# Patient Record
Sex: FEMALE | Race: WHITE | Employment: PART TIME | URBAN - METROPOLITAN AREA
[De-identification: names, ages, dates, MRNs, and addresses within clinical notes are randomized per-mention and may not be internally consistent; named-entity substitution may affect disease eponyms.]

---

## 2017-03-29 ENCOUNTER — OFFICE VISIT (OUTPATIENT)
Dept: PHYSICAL THERAPY | Facility: HOSPITAL | Age: 72
End: 2017-03-29
Attending: INTERNAL MEDICINE
Payer: MEDICARE

## 2017-03-29 DIAGNOSIS — K59.00 CONSTIPATION: Primary | ICD-10-CM

## 2017-03-29 PROCEDURE — 97535 SELF CARE MNGMENT TRAINING: CPT

## 2017-03-29 PROCEDURE — 97140 MANUAL THERAPY 1/> REGIONS: CPT

## 2017-03-29 PROCEDURE — 97161 PT EVAL LOW COMPLEX 20 MIN: CPT

## 2017-03-29 NOTE — PROGRESS NOTES
MUSCULOSKELETAL AND PELVIC FLOOR EVALUATION:   Referring Physician: Dr. Hang Gordon  Diagnosis: Constipation (K59.00)  Date of Onset: 6 months ago     Date of Service: 3/29/2017     PATIENT Erin Simon is a 73year old y/o female who presents to ligamentous/provocation testing normal with no concerns. Verbal consent was given for internal pelvic exam/assessment.  External perineal observation reveals normal tissue health and non-tender to palpation throughout pelvic clock bilateral. Internal pelvic hr  Amount: large  Nocturia: x2/night  Leaking occurs: N/A  Episodes of Leakage: N/A  Amount of leakage: N/A  Pad use: N/A  Pad Change frequency: N/A  Fluid Intake: 32 oz  Bladder irritants: coffee/tea/alcohol  Urine Stop test: N/A  Post void dribble: N/A WNL  Labia minora:  WNL  Urethral meatus: WNL  Introitus: WNL  Perineal body: WNL  Pelvic clock: non-tender to palpation throughout    Internal Palpation Left Right   Superficial Transverse perineal WNL WNL   Bulbocavernosus WNL WNL   Ischiocavernosus WNL W recreational exercise. 5. Pt to improve FOTO outcomes score to less than or equal to 25% impairment, for functional improvement in ADLs. Currently 47% impaired.      Patient Goal: \"have more control of bowels\"    Rehab Potential: good  Limiting factors:

## 2017-04-04 ENCOUNTER — OFFICE VISIT (OUTPATIENT)
Dept: PHYSICAL THERAPY | Facility: HOSPITAL | Age: 72
End: 2017-04-04
Attending: INTERNAL MEDICINE
Payer: MEDICARE

## 2017-04-04 PROCEDURE — 97110 THERAPEUTIC EXERCISES: CPT

## 2017-04-04 PROCEDURE — 97112 NEUROMUSCULAR REEDUCATION: CPT

## 2017-04-04 NOTE — PROGRESS NOTES
Dx: Constipation (K59.00)          Authorized # of Visits/Insurance:  Medicare (10)  Script Dates: 3/29-6/27           Next MD visit: none scheduled  Referring MD: Radha Orozco  Fall Risk:  standard         Precautions: n/a           Medication Changes Patient to report reduction in fecal incontinence episodes to 1-2x month for improve perineal tissue health/function and promote involvement in community based activities.    4. Patient to increase global proximal hip strength 1/2 MMT to improve lumbopelvi

## 2017-04-11 ENCOUNTER — OFFICE VISIT (OUTPATIENT)
Dept: PHYSICAL THERAPY | Facility: HOSPITAL | Age: 72
End: 2017-04-11
Attending: INTERNAL MEDICINE
Payer: MEDICARE

## 2017-04-11 PROCEDURE — 97112 NEUROMUSCULAR REEDUCATION: CPT

## 2017-04-11 PROCEDURE — 97110 THERAPEUTIC EXERCISES: CPT

## 2017-04-11 NOTE — PROGRESS NOTES
Dx: Constipation (K59.00)          Authorized # of Visits/Insurance:  Medicare (10)  Script Dates: 3/29-6/27           Next MD visit: none scheduled  Referring MD: Ruthie Ross  Fall Risk:  standard         Precautions: n/a           Medication Changes demonstrates improved pelvic floor strength, 4/5 MMT, for at least 8 seconds endurance in order to make it to the bathroom without experiencing fecal incontinence when urge present.    3.  Patient to report reduction in fecal incontinence episodes to 1-2x m

## 2017-04-18 ENCOUNTER — OFFICE VISIT (OUTPATIENT)
Dept: PHYSICAL THERAPY | Facility: HOSPITAL | Age: 72
End: 2017-04-18
Attending: INTERNAL MEDICINE
Payer: MEDICARE

## 2017-04-18 PROCEDURE — 97112 NEUROMUSCULAR REEDUCATION: CPT

## 2017-04-18 PROCEDURE — 97110 THERAPEUTIC EXERCISES: CPT

## 2017-04-18 NOTE — PROGRESS NOTES
Dx: Constipation (K59.00)          Authorized # of Visits/Insurance:  Medicare (10)  Script Dates: 3/29-6/27           Next MD visit: none scheduled  Referring MD: Sabine Bone  Fall Risk:  standard         Precautions: n/a           Medication Changes training. Charges: 1 TherEx, 2 NMR       Total Timed Treatment: 40 min  Total Treatment Time: 43 min  Therapist: Darcie Kirk PT, DPT    Long Term Goals Timeframe (8 visits, 3/29-6/27)  1.  Patient to be independent with progressive HEP during and upon

## 2017-04-25 ENCOUNTER — OFFICE VISIT (OUTPATIENT)
Dept: PHYSICAL THERAPY | Facility: HOSPITAL | Age: 72
End: 2017-04-25
Attending: INTERNAL MEDICINE
Payer: MEDICARE

## 2017-04-25 PROCEDURE — 97112 NEUROMUSCULAR REEDUCATION: CPT

## 2017-04-25 NOTE — PROGRESS NOTES
Dx: Constipation (K59.00)          Authorized # of Visits/Insurance:  Medicare (10)  Script Dates: 3/29-6/27           Next MD visit: none scheduled  Referring MD: Jarred Taylor  Fall Risk:  standard         Precautions: n/a           Medication Changes throughout. Provided verbal/tactile cues to maximize muscle activation and correct form. Updated HEP. Plan: Continue to progress pelvic floor/core/hip strengthening per patient tolerance with emphasis on functional training.      Charges: 2 NMR       To

## 2017-05-02 ENCOUNTER — OFFICE VISIT (OUTPATIENT)
Dept: PHYSICAL THERAPY | Facility: HOSPITAL | Age: 72
End: 2017-05-02
Attending: INTERNAL MEDICINE
Payer: MEDICARE

## 2017-05-02 PROCEDURE — 97112 NEUROMUSCULAR REEDUCATION: CPT

## 2017-05-02 NOTE — PROGRESS NOTES
Dx: Constipation (K59.00)          Authorized # of Visits/Insurance:  Medicare (10)  Script Dates: 3/29-6/27           Next MD visit: none scheduled  Referring MD: Ofelia López  Fall Risk:  standard         Precautions: uterine cancer 1989/1990; chemo/ upon discharge to aide with symptom management and return to previous level of function.    2. Patient to demonstrates improved pelvic floor strength, 4/5 MMT, for at least 8 seconds endurance in order to make it to the bathroom without experiencing fecal

## 2017-05-09 ENCOUNTER — OFFICE VISIT (OUTPATIENT)
Dept: PHYSICAL THERAPY | Facility: HOSPITAL | Age: 72
End: 2017-05-09
Attending: INTERNAL MEDICINE
Payer: MEDICARE

## 2017-05-09 PROCEDURE — 97535 SELF CARE MNGMENT TRAINING: CPT

## 2017-05-09 PROCEDURE — 97140 MANUAL THERAPY 1/> REGIONS: CPT

## 2017-05-09 NOTE — PROGRESS NOTES
Patient Name: Soni Campos  YOB: 1945          MRN number:  [id number]  Date:  5/9/2017  Referring Physician:  Jose Manuel Khan  Dx: Constipation (K59.00)    Physical Therapy Discharge Summary    Reporting Period: 3/29-5/9    Subjective: Judyth Shock 5/5   Extension 5/5 5/5   Ankle:     Dorsiflexion 5/5 5/5   Abdominals: NT      FLEXIBILITY/PALPATION  Muscle Left Right   Iliopsoas WNL WNL   Hamstrings WNL WNL   Piriformis WNL WNL   Adductors WNL WNL   Scars Location/Surgery: N/A      SPECIAL TESTS  All promote return to recreational exercise. (Met 5/9/17)    5. Pt to improve FOTO outcomes score to less than or equal to 25% impairment, for functional improvement in ADLs. Currently 47% impaired.      Patient Goal: Tia Zoëy more control of bowels\" (Met 5/9/1

## 2017-05-15 ENCOUNTER — APPOINTMENT (OUTPATIENT)
Dept: LAB | Facility: HOSPITAL | Age: 72
End: 2017-05-15
Attending: FAMILY MEDICINE
Payer: MEDICARE

## 2017-05-15 ENCOUNTER — OFFICE VISIT (OUTPATIENT)
Dept: FAMILY MEDICINE CLINIC | Facility: CLINIC | Age: 72
End: 2017-05-15

## 2017-05-15 VITALS
DIASTOLIC BLOOD PRESSURE: 86 MMHG | HEART RATE: 110 BPM | SYSTOLIC BLOOD PRESSURE: 124 MMHG | WEIGHT: 203 LBS | HEIGHT: 65 IN | OXYGEN SATURATION: 98 % | BODY MASS INDEX: 33.82 KG/M2 | TEMPERATURE: 98 F

## 2017-05-15 DIAGNOSIS — C55 UTERINE CARCINOMA (HCC): ICD-10-CM

## 2017-05-15 DIAGNOSIS — B37.9 CANDIDA ALBICANS INFECTION: ICD-10-CM

## 2017-05-15 DIAGNOSIS — E55.9 VITAMIN D DEFICIENCY: ICD-10-CM

## 2017-05-15 DIAGNOSIS — R15.2 INCONTINENCE OF FECES WITH FECAL URGENCY: ICD-10-CM

## 2017-05-15 DIAGNOSIS — R15.9 INCONTINENCE OF FECES WITH FECAL URGENCY: ICD-10-CM

## 2017-05-15 DIAGNOSIS — E66.09 OBESITY DUE TO EXCESS CALORIES, UNSPECIFIED OBESITY SEVERITY: ICD-10-CM

## 2017-05-15 DIAGNOSIS — E78.00 PURE HYPERCHOLESTEROLEMIA: Primary | ICD-10-CM

## 2017-05-15 DIAGNOSIS — C54.1 MALIGNANT NEOPLASM OF ENDOMETRIUM (HCC): ICD-10-CM

## 2017-05-15 PROCEDURE — 82306 VITAMIN D 25 HYDROXY: CPT

## 2017-05-15 PROCEDURE — 83516 IMMUNOASSAY NONANTIBODY: CPT

## 2017-05-15 PROCEDURE — 36415 COLL VENOUS BLD VENIPUNCTURE: CPT

## 2017-05-15 PROCEDURE — 86628 CANDIDA ANTIBODY: CPT

## 2017-05-15 PROCEDURE — 84443 ASSAY THYROID STIM HORMONE: CPT

## 2017-05-15 PROCEDURE — 99204 OFFICE O/P NEW MOD 45 MIN: CPT | Performed by: FAMILY MEDICINE

## 2017-05-15 NOTE — PATIENT INSTRUCTIONS
Avoid all dairy products. Omega 3 fatty acids are anti-inflammatory and important for brain and nervous system health, including memory.   Some good brands are:  - at [employer] stores: [employer], [employer]'s Udo Oil blend (vegan), Garden of Life   - on

## 2017-05-15 NOTE — PROGRESS NOTES
Smooth Julien is a 73year old female. Patient presents with: Incontinence: f/p      HPI:   Here for concerns of fecal incontinence. Started about 2yrs ago. Had urgency and had several accidents where she did not make it to the toilet in time.   The stool Narrative    Lives with     No children    Part-time teacher, Works in an office as well       SURGICAL HISTORY:     Past Surgical History   Procedure Laterality Date   • Anesth,radical hysterectomy         PHYSICAL EXAM:      05/15/17  1304   BP: 1 Vitamin D, 25-Hydroxy [E]; Future      Patient Instructions   Avoid all dairy products. Omega 3 fatty acids are anti-inflammatory and important for brain and nervous system health, including memory.   Some good brands are:  - at [employer] stores: [employer]

## 2017-05-16 ENCOUNTER — APPOINTMENT (OUTPATIENT)
Dept: PHYSICAL THERAPY | Facility: HOSPITAL | Age: 72
End: 2017-05-16
Attending: INTERNAL MEDICINE
Payer: MEDICARE

## 2017-06-05 ENCOUNTER — OFFICE VISIT (OUTPATIENT)
Dept: FAMILY MEDICINE CLINIC | Facility: CLINIC | Age: 72
End: 2017-06-05

## 2017-06-05 ENCOUNTER — TELEPHONE (OUTPATIENT)
Dept: OBGYN CLINIC | Facility: CLINIC | Age: 72
End: 2017-06-05

## 2017-06-05 VITALS
HEIGHT: 65 IN | OXYGEN SATURATION: 99 % | HEART RATE: 97 BPM | SYSTOLIC BLOOD PRESSURE: 132 MMHG | WEIGHT: 198 LBS | BODY MASS INDEX: 32.99 KG/M2 | RESPIRATION RATE: 17 BRPM | DIASTOLIC BLOOD PRESSURE: 82 MMHG | TEMPERATURE: 98 F

## 2017-06-05 DIAGNOSIS — R15.2 INCONTINENCE OF FECES WITH FECAL URGENCY: Primary | ICD-10-CM

## 2017-06-05 DIAGNOSIS — R15.9 INCONTINENCE OF FECES WITH FECAL URGENCY: Primary | ICD-10-CM

## 2017-06-05 DIAGNOSIS — E66.09 OBESITY DUE TO EXCESS CALORIES, UNSPECIFIED OBESITY SEVERITY: ICD-10-CM

## 2017-06-05 DIAGNOSIS — E55.9 VITAMIN D DEFICIENCY: ICD-10-CM

## 2017-06-05 DIAGNOSIS — C55 UTERINE CARCINOMA (HCC): ICD-10-CM

## 2017-06-05 DIAGNOSIS — C54.1 MALIGNANT NEOPLASM OF ENDOMETRIUM (HCC): ICD-10-CM

## 2017-06-05 PROCEDURE — 99214 OFFICE O/P EST MOD 30 MIN: CPT | Performed by: FAMILY MEDICINE

## 2017-06-05 NOTE — PATIENT INSTRUCTIONS
Digestive enzymes - take before each meal  Vitamin D is an important backbone for many hormones and neurotransmitters. It is important for energy and mood.   Some good sources:  - in [employer] food stores: [employer] or Dr. Eusebio lindsey  - online: Biotic

## 2017-06-05 NOTE — PROGRESS NOTES
Pramod Garcia is a 81year old female. Patient presents with: Follow - Up: lab      HPI:   Here for f/u on fecal incontinence. She is doing ok. Has not had an accident in about 1 mo, can now hold it and make it to the BR. She had 2 episodes of urgency. EXAM:      06/05/17  1407   BP: 132/82   Pulse: 97   Temp: 97.9 °F (36.6 °C)   TempSrc: Oral   Resp: 17   Height: 65\"   Weight: 198 lb   SpO2: 99%       Physical Exam   Constitutional: She is oriented to person, place, and time and well-developed, well-no

## 2017-09-11 ENCOUNTER — OFFICE VISIT (OUTPATIENT)
Dept: FAMILY MEDICINE CLINIC | Facility: CLINIC | Age: 72
End: 2017-09-11

## 2017-09-11 VITALS
SYSTOLIC BLOOD PRESSURE: 132 MMHG | HEIGHT: 65 IN | DIASTOLIC BLOOD PRESSURE: 82 MMHG | WEIGHT: 202 LBS | TEMPERATURE: 98 F | HEART RATE: 82 BPM | BODY MASS INDEX: 33.66 KG/M2 | OXYGEN SATURATION: 98 % | RESPIRATION RATE: 17 BRPM

## 2017-09-11 DIAGNOSIS — R73.03 PREDIABETES: ICD-10-CM

## 2017-09-11 DIAGNOSIS — C54.1 MALIGNANT NEOPLASM OF ENDOMETRIUM (HCC): ICD-10-CM

## 2017-09-11 DIAGNOSIS — R15.9 INCONTINENCE OF FECES WITH FECAL URGENCY: Primary | ICD-10-CM

## 2017-09-11 DIAGNOSIS — E55.9 VITAMIN D DEFICIENCY: ICD-10-CM

## 2017-09-11 DIAGNOSIS — R15.2 INCONTINENCE OF FECES WITH FECAL URGENCY: Primary | ICD-10-CM

## 2017-09-11 DIAGNOSIS — E66.09 OBESITY DUE TO EXCESS CALORIES, UNSPECIFIED OBESITY SEVERITY: ICD-10-CM

## 2017-09-11 DIAGNOSIS — T78.1XXA FOOD SENSITIVITY WITH GASTROINTESTINAL SYMPTOMS: ICD-10-CM

## 2017-09-11 PROCEDURE — 99214 OFFICE O/P EST MOD 30 MIN: CPT | Performed by: FAMILY MEDICINE

## 2017-09-11 RX ORDER — MULTIVIT-MIN/IRON/FOLIC ACID/K 18-600-40
CAPSULE ORAL DAILY
COMMUNITY
End: 2019-12-23

## 2017-09-11 NOTE — PROGRESS NOTES
Viki Gipson is a 81year old female. Patient presents with: Follow - Up: Incontinence, only has had 2 accidents/much better      HPI:   Here for f/u. Her incontinence is much improved. She started using bio-gest and the Vitamin D.   She has only had 2 Constitutional: She is oriented to person, place, and time and well-developed, well-nourished, and in no distress. HENT:   Head: Normocephalic and atraumatic. Eyes: Conjunctivae and EOM are normal. Pupils are equal, round, and reactive to light.    Neck

## 2017-10-12 ENCOUNTER — HOSPITAL ENCOUNTER (OUTPATIENT)
Dept: MRI IMAGING | Facility: HOSPITAL | Age: 72
Discharge: HOME | End: 2017-10-12
Attending: FAMILY MEDICINE
Payer: MEDICARE

## 2017-10-12 DIAGNOSIS — R15.2 INCONTINENCE OF FECES WITH FECAL URGENCY: ICD-10-CM

## 2017-10-12 DIAGNOSIS — C54.1 MALIGNANT NEOPLASM OF ENDOMETRIUM (HCC): ICD-10-CM

## 2017-10-12 DIAGNOSIS — R15.9 INCONTINENCE OF FECES WITH FECAL URGENCY: ICD-10-CM

## 2017-12-04 ENCOUNTER — OFFICE VISIT (OUTPATIENT)
Dept: FAMILY MEDICINE CLINIC | Facility: CLINIC | Age: 72
End: 2017-12-04

## 2017-12-04 VITALS
WEIGHT: 203 LBS | OXYGEN SATURATION: 97 % | RESPIRATION RATE: 17 BRPM | BODY MASS INDEX: 33.82 KG/M2 | SYSTOLIC BLOOD PRESSURE: 132 MMHG | HEIGHT: 65 IN | HEART RATE: 102 BPM | TEMPERATURE: 97 F | DIASTOLIC BLOOD PRESSURE: 86 MMHG

## 2017-12-04 DIAGNOSIS — E78.00 PURE HYPERCHOLESTEROLEMIA: ICD-10-CM

## 2017-12-04 DIAGNOSIS — T78.1XXA FOOD SENSITIVITY WITH GASTROINTESTINAL SYMPTOMS: ICD-10-CM

## 2017-12-04 DIAGNOSIS — C54.1 MALIGNANT NEOPLASM OF ENDOMETRIUM (HCC): ICD-10-CM

## 2017-12-04 DIAGNOSIS — C55 UTERINE CARCINOMA (HCC): ICD-10-CM

## 2017-12-04 DIAGNOSIS — R73.03 PREDIABETES: ICD-10-CM

## 2017-12-04 DIAGNOSIS — R15.2 INCONTINENCE OF FECES WITH FECAL URGENCY: Primary | ICD-10-CM

## 2017-12-04 DIAGNOSIS — R15.9 INCONTINENCE OF FECES WITH FECAL URGENCY: Primary | ICD-10-CM

## 2017-12-04 DIAGNOSIS — B37.9 CANDIDA ALBICANS INFECTION: ICD-10-CM

## 2017-12-04 PROCEDURE — 99214 OFFICE O/P EST MOD 30 MIN: CPT | Performed by: FAMILY MEDICINE

## 2017-12-08 ENCOUNTER — HOSPITAL ENCOUNTER (OUTPATIENT)
Dept: CT IMAGING | Facility: HOSPITAL | Age: 72
Discharge: HOME | End: 2017-12-08
Attending: FAMILY MEDICINE
Payer: MEDICARE

## 2017-12-08 DIAGNOSIS — R15.2 INCONTINENCE OF FECES WITH FECAL URGENCY: ICD-10-CM

## 2017-12-08 DIAGNOSIS — C54.1 MALIGNANT NEOPLASM OF ENDOMETRIUM (HCC): ICD-10-CM

## 2017-12-08 DIAGNOSIS — C55 UTERINE CARCINOMA (HCC): ICD-10-CM

## 2017-12-08 DIAGNOSIS — R15.9 INCONTINENCE OF FECES WITH FECAL URGENCY: ICD-10-CM

## 2017-12-08 PROCEDURE — 74176 CT ABD & PELVIS W/O CONTRAST: CPT | Performed by: FAMILY MEDICINE

## 2017-12-16 NOTE — PROGRESS NOTES
Salena Ryan is a 81year old female. Patient presents with:   Follow - Up: incontinence, doing much better, exercises are helping the control of urine, MRI not done  Other: discontinued statin per patient decision, wishes to control with diet      HPI: • Anesth,radical hysterectomy     • Hysterectomy     • Removal of lung,lobectomy  1990       PHYSICAL EXAM:      12/04/17  1358 12/04/17  1453   BP: 140/90 132/86   BP Location: Right arm    Pulse: 102    Resp: 17    Temp: (!) 97.1 °F (36.2 °C)    TempSrc: The products and items listed below (the “Products”)  and their claims have not been evaluated by the Food and Drug Administration. Dietary products are not intended to treat, prevent, mitigate or cure disease.  Ultimately, you must draw your own conclusion

## 2017-12-18 ENCOUNTER — TELEPHONE (OUTPATIENT)
Dept: OBGYN CLINIC | Facility: CLINIC | Age: 72
End: 2017-12-18

## 2017-12-18 DIAGNOSIS — K76.9 LIVER LESION: Primary | ICD-10-CM

## 2017-12-18 NOTE — TELEPHONE ENCOUNTER
Called patient, gave CT results, advised of need to MRI, patient was not happy about this because she does not like small spaces and did not do well with last MRI. Patient states she wants to think about doing MRI.

## 2017-12-19 NOTE — TELEPHONE ENCOUNTER
New order placed for MRI liver with/without contrast. Pt needs to have an open MRI done as she is not fond of closed spaces. Placed order and under \"comment\" section wrote for open MRI to be done.

## 2018-01-15 ENCOUNTER — TELEPHONE (OUTPATIENT)
Dept: FAMILY MEDICINE CLINIC | Facility: CLINIC | Age: 73
End: 2018-01-15

## 2018-01-15 NOTE — TELEPHONE ENCOUNTER
Patient called office stating she has scheduled her open MRI for 1/25/17 @ 745am. Patient advised that they may sedate her due to possible head closure. Patient questioning if she needs another order to get put under?

## 2018-01-16 NOTE — TELEPHONE ENCOUNTER
Called patient, she states she is unsure about taking xanax, but would like to try temporary sedation for MRI. Need new order placed.

## 2018-01-18 ENCOUNTER — TELEPHONE (OUTPATIENT)
Dept: FAMILY MEDICINE CLINIC | Facility: CLINIC | Age: 73
End: 2018-01-18

## 2018-01-18 DIAGNOSIS — R16.0 LIVER MASS: Primary | ICD-10-CM

## 2018-01-18 NOTE — TELEPHONE ENCOUNTER
Spoke with Liza Holguin, she said to reorder MRI of liver w+w/o with comments saying patient needs open MRI and sedation anesthesia. No other changes need to be made in order.

## 2018-01-18 NOTE — TELEPHONE ENCOUNTER
Juan Speak calling from scheduling calling regarding pt and possible sedation.  Juan Speak states she called earlier today and is trying to follow up. [phone]

## 2018-01-18 NOTE — TELEPHONE ENCOUNTER
MRI scheduled for 1-25-18 in open MRI. Patient is apprehensive to take Xanax for MRI. If she needs sedation she will need to have it at [facility] and a new order needs to be generated.

## 2018-02-13 ENCOUNTER — TELEPHONE (OUTPATIENT)
Dept: FAMILY MEDICINE CLINIC | Facility: CLINIC | Age: 73
End: 2018-02-13

## 2018-02-15 NOTE — TELEPHONE ENCOUNTER
Patient states was going to do MRI and got panic attack, patient stated she will be doing delay on MRI.  is getting Umbilical hernia repaired and needs to care for him.   Patient requested that I mail her CT to her address - confirmed address with isabelle

## 2018-02-27 ENCOUNTER — TELEPHONE (OUTPATIENT)
Dept: FAMILY MEDICINE CLINIC | Facility: CLINIC | Age: 73
End: 2018-02-27

## 2018-02-27 NOTE — TELEPHONE ENCOUNTER
Called patient to advise she is due for annual medicare px, patient did not answer, left message to call office back.

## 2018-04-20 ENCOUNTER — TELEPHONE (OUTPATIENT)
Dept: FAMILY MEDICINE CLINIC | Facility: CLINIC | Age: 73
End: 2018-04-20

## 2018-04-20 NOTE — TELEPHONE ENCOUNTER
Patient needs medical clearance for eye sugery on right eye at [facility] on 5-2. Patient has medicare annual exam scheduled on 5-17. Patient wanted to move the appointment up but I was unable to make her an appointment sooner.   Patient look

## 2018-04-25 ENCOUNTER — TELEPHONE (OUTPATIENT)
Dept: FAMILY MEDICINE CLINIC | Facility: CLINIC | Age: 73
End: 2018-04-25

## 2018-04-25 NOTE — TELEPHONE ENCOUNTER
[facility] pre surgical calling for pts pre op clearance to be faxed over.  I explained pts appointment is 04/26 will fax once note is finished fax [phone]

## 2018-04-26 ENCOUNTER — OFFICE VISIT (OUTPATIENT)
Dept: FAMILY MEDICINE CLINIC | Facility: CLINIC | Age: 73
End: 2018-04-26

## 2018-04-26 VITALS
HEART RATE: 98 BPM | OXYGEN SATURATION: 94 % | WEIGHT: 204 LBS | DIASTOLIC BLOOD PRESSURE: 90 MMHG | BODY MASS INDEX: 33.99 KG/M2 | HEIGHT: 65 IN | SYSTOLIC BLOOD PRESSURE: 132 MMHG | RESPIRATION RATE: 16 BRPM

## 2018-04-26 DIAGNOSIS — R73.03 PREDIABETES: ICD-10-CM

## 2018-04-26 DIAGNOSIS — E78.00 PURE HYPERCHOLESTEROLEMIA: ICD-10-CM

## 2018-04-26 DIAGNOSIS — Z01.818 PRE-OP EVALUATION: ICD-10-CM

## 2018-04-26 DIAGNOSIS — H02.401 PTOSIS, RIGHT EYELID: Primary | ICD-10-CM

## 2018-04-26 DIAGNOSIS — E66.09 OBESITY DUE TO EXCESS CALORIES, UNSPECIFIED OBESITY SEVERITY: ICD-10-CM

## 2018-04-26 DIAGNOSIS — C54.1 MALIGNANT NEOPLASM OF ENDOMETRIUM (HCC): ICD-10-CM

## 2018-04-26 DIAGNOSIS — E55.9 VITAMIN D DEFICIENCY: ICD-10-CM

## 2018-04-26 PROCEDURE — 99214 OFFICE O/P EST MOD 30 MIN: CPT | Performed by: FAMILY MEDICINE

## 2018-04-26 NOTE — PROGRESS NOTES
Jennie Stevenson is a 81year old female. Patient presents with:  Pre-Op Exam: Right ptosis of eye lid dos 5/2 @ [facility]      HPI:   PREOP EXAM    PRE-OP Physical  What is the full name of procedure/ surgery?  blepharoplasty of from Last 6 Encounters:  04/26/18 : 132/90  12/04/17 : 132/86  09/11/17 : 132/82  06/05/17 : 132/82  05/15/17 : 124/86  09/23/16 : 144/93      Wt Readings from Last 6 Encounters:  04/26/18 : 204 lb  12/04/17 : 203 lb  09/11/17 : 202 lb  06/05/17 : 198 lb

## 2018-04-30 ENCOUNTER — APPOINTMENT (OUTPATIENT)
Dept: LAB | Facility: HOSPITAL | Age: 73
End: 2018-04-30
Attending: FAMILY MEDICINE
Payer: MEDICARE

## 2018-04-30 ENCOUNTER — TELEPHONE (OUTPATIENT)
Dept: FAMILY MEDICINE CLINIC | Facility: CLINIC | Age: 73
End: 2018-04-30

## 2018-04-30 DIAGNOSIS — Z01.818 PRE-OP EVALUATION: ICD-10-CM

## 2018-04-30 DIAGNOSIS — R73.03 PREDIABETES: ICD-10-CM

## 2018-04-30 DIAGNOSIS — E78.00 PURE HYPERCHOLESTEROLEMIA: ICD-10-CM

## 2018-04-30 DIAGNOSIS — E55.9 VITAMIN D DEFICIENCY: ICD-10-CM

## 2018-04-30 PROCEDURE — 36415 COLL VENOUS BLD VENIPUNCTURE: CPT

## 2018-04-30 PROCEDURE — 86803 HEPATITIS C AB TEST: CPT

## 2018-04-30 PROCEDURE — 83036 HEMOGLOBIN GLYCOSYLATED A1C: CPT

## 2018-04-30 PROCEDURE — 93010 ELECTROCARDIOGRAM REPORT: CPT | Performed by: FAMILY MEDICINE

## 2018-04-30 PROCEDURE — 93005 ELECTROCARDIOGRAM TRACING: CPT

## 2018-04-30 PROCEDURE — 82306 VITAMIN D 25 HYDROXY: CPT

## 2018-04-30 PROCEDURE — 80053 COMPREHEN METABOLIC PANEL: CPT

## 2018-04-30 NOTE — TELEPHONE ENCOUNTER
Attempted to call patient to advise she has not done EKG or lab work, unable to send clearance - patient did not answer, left message to call office back.

## 2018-04-30 NOTE — TELEPHONE ENCOUNTER
Patient would like to know if cleared for surgery.  Also received a call from surgery depertment would like clearance faxed over [phone] Attn: Pia Apley

## 2018-04-30 NOTE — TELEPHONE ENCOUNTER
Called patient, advised EKG and labs were faxed to office for medical clearance. Patient verbalized understanding.

## 2018-05-01 ENCOUNTER — APPOINTMENT (OUTPATIENT)
Dept: LAB | Facility: HOSPITAL | Age: 73
End: 2018-05-01
Attending: FAMILY MEDICINE
Payer: MEDICARE

## 2018-05-01 DIAGNOSIS — E78.00 PURE HYPERCHOLESTEROLEMIA: ICD-10-CM

## 2018-05-01 PROCEDURE — 80061 LIPID PANEL: CPT

## 2018-05-01 PROCEDURE — 36415 COLL VENOUS BLD VENIPUNCTURE: CPT

## 2018-05-17 ENCOUNTER — OFFICE VISIT (OUTPATIENT)
Dept: FAMILY MEDICINE CLINIC | Facility: CLINIC | Age: 73
End: 2018-05-17

## 2018-05-17 VITALS
RESPIRATION RATE: 16 BRPM | OXYGEN SATURATION: 98 % | BODY MASS INDEX: 35.79 KG/M2 | DIASTOLIC BLOOD PRESSURE: 90 MMHG | WEIGHT: 202 LBS | SYSTOLIC BLOOD PRESSURE: 142 MMHG | HEART RATE: 62 BPM | HEIGHT: 63 IN

## 2018-05-17 DIAGNOSIS — C54.1 MALIGNANT NEOPLASM OF ENDOMETRIUM (HCC): ICD-10-CM

## 2018-05-17 DIAGNOSIS — T78.1XXA FOOD SENSITIVITY WITH GASTROINTESTINAL SYMPTOMS: ICD-10-CM

## 2018-05-17 DIAGNOSIS — B37.9 CANDIDA ALBICANS INFECTION: ICD-10-CM

## 2018-05-17 DIAGNOSIS — Z00.00 ROUTINE MEDICAL EXAM: Primary | ICD-10-CM

## 2018-05-17 DIAGNOSIS — E78.00 PURE HYPERCHOLESTEROLEMIA: ICD-10-CM

## 2018-05-17 DIAGNOSIS — H91.92 HEARING LOSS OF LEFT EAR, UNSPECIFIED HEARING LOSS TYPE: ICD-10-CM

## 2018-05-17 DIAGNOSIS — E55.9 VITAMIN D DEFICIENCY: ICD-10-CM

## 2018-05-17 DIAGNOSIS — C55 UTERINE CARCINOMA (HCC): ICD-10-CM

## 2018-05-17 DIAGNOSIS — E66.09 OBESITY DUE TO EXCESS CALORIES, UNSPECIFIED OBESITY SEVERITY: ICD-10-CM

## 2018-05-17 DIAGNOSIS — R73.03 PREDIABETES: ICD-10-CM

## 2018-05-17 DIAGNOSIS — R15.9 INCONTINENCE OF FECES WITH FECAL URGENCY: ICD-10-CM

## 2018-05-17 DIAGNOSIS — R15.2 INCONTINENCE OF FECES WITH FECAL URGENCY: ICD-10-CM

## 2018-05-17 DIAGNOSIS — H61.22 IMPACTED CERUMEN OF LEFT EAR: ICD-10-CM

## 2018-05-17 PROCEDURE — G0439 PPPS, SUBSEQ VISIT: HCPCS | Performed by: FAMILY MEDICINE

## 2018-05-17 PROCEDURE — 69209 REMOVE IMPACTED EAR WAX UNI: CPT | Performed by: FAMILY MEDICINE

## 2018-05-17 NOTE — PATIENT INSTRUCTIONS
The products and items listed below (the “Products”)  and their claims have not been evaluated by the Food and Drug Administration. Dietary products are not intended to treat, prevent, mitigate or cure disease.  Ultimately, you must draw your own conclusion - online: Sharri Darbylps [place] Acid   Description: NOW® Caprylic Acid is a naturally derived nutrient also known as octanoic acid. Caprylic Acid is a medium chain fatty acid (MCT) that is naturally found in coconut and palm kernel oil.

## 2018-05-17 NOTE — PROGRESS NOTES
HPI:   Gali Romero is a 81year old female who presents for a Medicare Subsequent Annual Wellness visit (Pt already had Initial Annual Wellness).   Patient presents with:  Physical: annual medicare px, not fasting, due for dxa and mammogram due 12/2018 2 WEEKS         Advanced Directive:   She does NOT have a Living Will on file in [address] [place] [address] Rd.    Advance care planning including the explanation and discussion of advance directives standard forms performed Face to Face with patient and Family/surrogate (if presen mouth every other day. Cholecalciferol (VITAMIN D) 2000 units Oral Cap Take by mouth daily. MEDICAL INFORMATION:   She  has a past medical history of History of uterine cancer (1981).     She  has a past surgical history that includes anesth,radi Neck supple. Cardiovascular: Normal rate, regular rhythm and normal heart sounds. Pulmonary/Chest: Effort normal and breath sounds normal.   Abdominal: Soft. Musculoskeletal: Normal range of motion.    Neurological: She is alert and oriented to [facility] SCHEDULE Internal Lab or Procedure External Lab or Procedure   Diabetes Screening      HbgA1C   Annually Glycohemoglobin (HgA1c) (%)   Date Value   04/30/2018 5.8    No flowsheet data found.     Fasting Blood Sugar (FSB)Annually   Glucose (mg/dL)   Date Wanda after 65 11/13/2016 Please get once after your 65th birthday    Hepatitis B for Moderate/High Risk No vaccine history found Medium/high risk factors:   End-stage renal disease   Hemophiliacs who received Factor VIII or IX concentrates   Clients of institut

## 2018-09-28 ENCOUNTER — TELEPHONE (OUTPATIENT)
Dept: FAMILY MEDICINE CLINIC | Facility: CLINIC | Age: 73
End: 2018-09-28

## 2018-09-28 DIAGNOSIS — R16.0 LIVER MASS: ICD-10-CM

## 2018-09-28 DIAGNOSIS — K76.9 LIVER LESION: Primary | ICD-10-CM

## 2018-10-02 ENCOUNTER — TELEPHONE (OUTPATIENT)
Dept: FAMILY MEDICINE CLINIC | Facility: CLINIC | Age: 73
End: 2018-10-02

## 2018-10-02 NOTE — TELEPHONE ENCOUNTER
Pts last [address] physical was 5/2017. LM for pt to contact the office to schedule in order to provide H&P to PAT for MRI to be scheduled. Let Cory Vidales know in PAT as well.

## 2018-10-02 NOTE — TELEPHONE ENCOUNTER
Luisa Gallegos calling from Pre Admission over at [address] patient is scheduled for an MRI on 10-10-18 and is needing an History and Physical from the last 30 days on Patient states Only seeing last visit from May .  Luisa Gallegos will like an call back

## 2018-10-08 ENCOUNTER — OFFICE VISIT (OUTPATIENT)
Dept: FAMILY MEDICINE CLINIC | Facility: CLINIC | Age: 73
End: 2018-10-08
Payer: MEDICARE

## 2018-10-08 VITALS
BODY MASS INDEX: 34.32 KG/M2 | DIASTOLIC BLOOD PRESSURE: 86 MMHG | OXYGEN SATURATION: 98 % | HEIGHT: 65 IN | WEIGHT: 206 LBS | HEART RATE: 100 BPM | SYSTOLIC BLOOD PRESSURE: 122 MMHG

## 2018-10-08 DIAGNOSIS — R15.9 INCONTINENCE OF FECES WITH FECAL URGENCY: ICD-10-CM

## 2018-10-08 DIAGNOSIS — E66.9 OBESITY (BMI 30-39.9): ICD-10-CM

## 2018-10-08 DIAGNOSIS — R15.2 INCONTINENCE OF FECES WITH FECAL URGENCY: ICD-10-CM

## 2018-10-08 DIAGNOSIS — R73.03 PREDIABETES: Primary | ICD-10-CM

## 2018-10-08 DIAGNOSIS — E78.00 PURE HYPERCHOLESTEROLEMIA: ICD-10-CM

## 2018-10-08 PROCEDURE — 99214 OFFICE O/P EST MOD 30 MIN: CPT | Performed by: FAMILY MEDICINE

## 2018-10-08 RX ORDER — NEOMYCIN SULFATE, POLYMYXIN B SULFATE, AND DEXAMETHASONE 3.5; 10000; 1 MG/G; [USP'U]/G; MG/G
OINTMENT OPHTHALMIC
Refills: 1 | COMMUNITY
Start: 2018-07-13 | End: 2019-05-09

## 2018-10-08 NOTE — PROGRESS NOTES
Warden Stallings is a 72year old female. Patient presents with: Follow - Up: H and P for mri       HPI:     Still with difficulty holding her stool  Much better than before  Has only had 2-3 episodes in the last 5 months  Working on her weight and her diet. Used    Alcohol use: Yes      Comment: socially    Drug use: No       BP Readings from Last 6 Encounters:  10/08/18 : 122/86  05/17/18 : 142/90  04/26/18 : 132/90  12/04/17 : 132/86  09/11/17 : 132/82  06/05/17 : 132/82      Wt Readings from Last 6 Encount

## 2018-10-09 ENCOUNTER — TELEPHONE (OUTPATIENT)
Dept: FAMILY MEDICINE CLINIC | Facility: CLINIC | Age: 73
End: 2018-10-09

## 2018-10-10 ENCOUNTER — ANESTHESIA EVENT (OUTPATIENT)
Dept: MRI IMAGING | Facility: HOSPITAL | Age: 73
End: 2018-10-10
Payer: MEDICARE

## 2018-10-10 ENCOUNTER — ANESTHESIA (OUTPATIENT)
Dept: MRI IMAGING | Facility: HOSPITAL | Age: 73
End: 2018-10-10
Payer: MEDICARE

## 2018-10-10 ENCOUNTER — HOSPITAL ENCOUNTER (OUTPATIENT)
Dept: MRI IMAGING | Facility: HOSPITAL | Age: 73
Discharge: HOME | End: 2018-10-10
Attending: FAMILY MEDICINE
Payer: MEDICARE

## 2018-10-10 VITALS
TEMPERATURE: 98 F | BODY MASS INDEX: 33.99 KG/M2 | DIASTOLIC BLOOD PRESSURE: 88 MMHG | HEIGHT: 65 IN | HEART RATE: 78 BPM | SYSTOLIC BLOOD PRESSURE: 141 MMHG | OXYGEN SATURATION: 96 % | RESPIRATION RATE: 16 BRPM | WEIGHT: 204 LBS

## 2018-10-10 DIAGNOSIS — R16.0 LIVER MASS: ICD-10-CM

## 2018-10-10 PROCEDURE — 99213 OFFICE O/P EST LOW 20 MIN: CPT | Performed by: HOSPITALIST

## 2018-10-10 PROCEDURE — G0463 HOSPITAL OUTPT CLINIC VISIT: HCPCS | Performed by: HOSPITALIST

## 2018-10-10 RX ORDER — SODIUM CHLORIDE, SODIUM LACTATE, POTASSIUM CHLORIDE, CALCIUM CHLORIDE 600; 310; 30; 20 MG/100ML; MG/100ML; MG/100ML; MG/100ML
INJECTION, SOLUTION INTRAVENOUS CONTINUOUS
Status: DISCONTINUED | OUTPATIENT
Start: 2018-10-10 | End: 2018-10-12

## 2018-10-10 RX ORDER — HYDROCODONE BITARTRATE AND ACETAMINOPHEN 5; 325 MG/1; MG/1
2 TABLET ORAL AS NEEDED
Status: DISCONTINUED | OUTPATIENT
Start: 2018-10-10 | End: 2018-10-12

## 2018-10-10 RX ORDER — MORPHINE SULFATE 10 MG/ML
6 INJECTION, SOLUTION INTRAMUSCULAR; INTRAVENOUS EVERY 10 MIN PRN
Status: DISCONTINUED | OUTPATIENT
Start: 2018-10-10 | End: 2018-10-12

## 2018-10-10 RX ORDER — ONDANSETRON 2 MG/ML
4 INJECTION INTRAMUSCULAR; INTRAVENOUS ONCE AS NEEDED
Status: ACTIVE | OUTPATIENT
Start: 2018-10-10 | End: 2018-10-10

## 2018-10-10 RX ORDER — PHENYLEPHRINE HCL 10 MG/ML
VIAL (ML) INJECTION AS NEEDED
Status: DISCONTINUED | OUTPATIENT
Start: 2018-10-10 | End: 2018-10-10 | Stop reason: SURG

## 2018-10-10 RX ORDER — NALOXONE HYDROCHLORIDE 0.4 MG/ML
80 INJECTION, SOLUTION INTRAMUSCULAR; INTRAVENOUS; SUBCUTANEOUS AS NEEDED
Status: ACTIVE | OUTPATIENT
Start: 2018-10-10 | End: 2018-10-10

## 2018-10-10 RX ORDER — METOCLOPRAMIDE 10 MG/1
10 TABLET ORAL ONCE
Status: COMPLETED | OUTPATIENT
Start: 2018-10-10 | End: 2018-10-10

## 2018-10-10 RX ORDER — DEXAMETHASONE SODIUM PHOSPHATE 4 MG/ML
VIAL (ML) INJECTION AS NEEDED
Status: DISCONTINUED | OUTPATIENT
Start: 2018-10-10 | End: 2018-10-10 | Stop reason: SURG

## 2018-10-10 RX ORDER — FAMOTIDINE 20 MG/1
20 TABLET ORAL ONCE
Status: COMPLETED | OUTPATIENT
Start: 2018-10-10 | End: 2018-10-10

## 2018-10-10 RX ORDER — ACETAMINOPHEN 500 MG
1000 TABLET ORAL ONCE
Status: COMPLETED | OUTPATIENT
Start: 2018-10-10 | End: 2018-10-10

## 2018-10-10 RX ORDER — ONDANSETRON 2 MG/ML
INJECTION INTRAMUSCULAR; INTRAVENOUS AS NEEDED
Status: DISCONTINUED | OUTPATIENT
Start: 2018-10-10 | End: 2018-10-10 | Stop reason: SURG

## 2018-10-10 RX ORDER — HYDROCODONE BITARTRATE AND ACETAMINOPHEN 5; 325 MG/1; MG/1
1 TABLET ORAL AS NEEDED
Status: DISCONTINUED | OUTPATIENT
Start: 2018-10-10 | End: 2018-10-12

## 2018-10-10 RX ORDER — HALOPERIDOL 5 MG/ML
0.25 INJECTION INTRAMUSCULAR ONCE AS NEEDED
Status: ACTIVE | OUTPATIENT
Start: 2018-10-10 | End: 2018-10-10

## 2018-10-10 RX ORDER — LIDOCAINE HYDROCHLORIDE 10 MG/ML
INJECTION, SOLUTION EPIDURAL; INFILTRATION; INTRACAUDAL; PERINEURAL AS NEEDED
Status: DISCONTINUED | OUTPATIENT
Start: 2018-10-10 | End: 2018-10-10 | Stop reason: SURG

## 2018-10-10 RX ORDER — MORPHINE SULFATE 4 MG/ML
4 INJECTION, SOLUTION INTRAMUSCULAR; INTRAVENOUS EVERY 10 MIN PRN
Status: DISCONTINUED | OUTPATIENT
Start: 2018-10-10 | End: 2018-10-12

## 2018-10-10 RX ORDER — MORPHINE SULFATE 2 MG/ML
2 INJECTION, SOLUTION INTRAMUSCULAR; INTRAVENOUS EVERY 10 MIN PRN
Status: DISCONTINUED | OUTPATIENT
Start: 2018-10-10 | End: 2018-10-12

## 2018-10-10 RX ADMIN — LIDOCAINE HYDROCHLORIDE 30 MG: 10 INJECTION, SOLUTION EPIDURAL; INFILTRATION; INTRACAUDAL; PERINEURAL at 13:51:00

## 2018-10-10 RX ADMIN — LIDOCAINE HYDROCHLORIDE 20 MG: 10 INJECTION, SOLUTION EPIDURAL; INFILTRATION; INTRACAUDAL; PERINEURAL at 13:52:00

## 2018-10-10 RX ADMIN — ONDANSETRON 4 MG: 2 INJECTION INTRAMUSCULAR; INTRAVENOUS at 14:30:00

## 2018-10-10 RX ADMIN — SODIUM CHLORIDE, SODIUM LACTATE, POTASSIUM CHLORIDE, CALCIUM CHLORIDE: 600; 310; 30; 20 INJECTION, SOLUTION INTRAVENOUS at 13:37:00

## 2018-10-10 RX ADMIN — FAMOTIDINE 20 MG: 20 TABLET ORAL at 12:19:00

## 2018-10-10 RX ADMIN — METOCLOPRAMIDE 10 MG: 10 TABLET ORAL at 12:19:00

## 2018-10-10 RX ADMIN — SODIUM CHLORIDE, SODIUM LACTATE, POTASSIUM CHLORIDE, CALCIUM CHLORIDE: 600; 310; 30; 20 INJECTION, SOLUTION INTRAVENOUS at 12:18:00

## 2018-10-10 RX ADMIN — PHENYLEPHRINE HCL 100 MCG: 10 MG/ML VIAL (ML) INJECTION at 14:15:00

## 2018-10-10 RX ADMIN — PHENYLEPHRINE HCL 100 MCG: 10 MG/ML VIAL (ML) INJECTION at 14:25:00

## 2018-10-10 RX ADMIN — DEXAMETHASONE SODIUM PHOSPHATE 4 MG: 4 MG/ML VIAL (ML) INJECTION at 14:30:00

## 2018-10-10 RX ADMIN — SODIUM CHLORIDE, SODIUM LACTATE, POTASSIUM CHLORIDE, CALCIUM CHLORIDE: 600; 310; 30; 20 INJECTION, SOLUTION INTRAVENOUS at 14:39:00

## 2018-10-10 RX ADMIN — ACETAMINOPHEN 1000 MG: 500 MG TABLET ORAL at 12:19:00

## 2018-10-10 NOTE — ANESTHESIA POSTPROCEDURE EVALUATION
Patient: Connor Stock    Procedure Summary     Date:  10/10/18 Room / Location:  [facility] MRI; [address] [facility]    Anesthesia Start:  4964 Anesthesia Stop:      Procedure:  MRI LIVER (W+WO) (JGQ=[id number]) Diagnosis:  Liver ma

## 2018-10-10 NOTE — ANESTHESIA PREPROCEDURE EVALUATION
Anesthesia PreOp Note    HPI:     Dorcas Eden is a 72year old female who presents for preoperative consultation requested by: * No surgeons listed *    Date of Surgery: 10/10/2018    * No procedures listed *  Indication: * No pre-op diagnosis entered * Iodine (Topical)        HIVES    Family History   Problem Relation Age of Onset   • Heart Disease Father    • Heart Disease Brother      Social History    Socioeconomic History      Marital status:       Spouse name: Not on file      Number 94%   Weight: 90.7 kg (200 lb) 92.5 kg (204 lb)   Height: 1.651 m (5' 5\") 1.651 m (5' 5\")        Anesthesia ROS/Med Hx and Physical Exam      No history of anesthetic complications   Airway   Mallampati: I  TM distance: >3 FB  Neck ROM: full  Dental

## 2018-10-10 NOTE — H&P
[facility] Patient Status:  Outpatient    1945  72year old [facility] [id number]   Location Room/bed info not found Attending Graciela Corea DO     PCP Papa Gray,      ASSESSMENT/PLAN    Liver le status:       Spouse name: Not on file      Number of children: Not on file      Years of education: Not on file      Highest education level: Not on file    Social Needs      Financial resource strain: Not on file      Food insecurity - worry: Not :Neg for back pain and joint pain. No swelling, open wounds  Skin: Neg for rash. Neuro: Neg for dizziness, focal weakness, LH, HA. Psych: Neg for suicidal ideas, confusion, agitation and depressed mood.    Other: All other review systems negative excep

## 2018-10-10 NOTE — ANESTHESIA PROCEDURE NOTES
ANESTHESIA INTUBATION  Date/Time: 10/10/2018 1:53 PM  Urgency: elective    Airway not difficult    General Information and Staff    Patient location during procedure:  Other (Note)  Anesthesiologist: Bryanna Hooper DO  Resident/CRNA: Tyra Chan CRNA  Perform

## 2018-10-15 ENCOUNTER — TELEPHONE (OUTPATIENT)
Dept: FAMILY MEDICINE CLINIC | Facility: CLINIC | Age: 73
End: 2018-10-15

## 2018-10-15 DIAGNOSIS — K76.0 NONALCOHOLIC HEPATOSTEATOSIS: Primary | ICD-10-CM

## 2018-10-15 NOTE — TELEPHONE ENCOUNTER
Pt called asking for mri results mailed to her home and wants dr collier's advise on what to do next.

## 2018-10-25 ENCOUNTER — OFFICE VISIT (OUTPATIENT)
Dept: INTEGRATIVE MEDICINE | Facility: CLINIC | Age: 73
End: 2018-10-25

## 2018-10-25 DIAGNOSIS — R73.03 PREDIABETES: Primary | ICD-10-CM

## 2018-10-25 DIAGNOSIS — E78.00 PURE HYPERCHOLESTEROLEMIA: ICD-10-CM

## 2018-10-25 PROCEDURE — S9452 NUTRITION CLASS: HCPCS | Performed by: NUTRITIONIST

## 2018-10-25 NOTE — PROGRESS NOTES
Patient referred by Dr. Mayfield ref. provider found self pay  Did patient complete Nutritional Therapy Questionnaire?  Aaron Hernandez is a 72year old female presenting for medical nutrition therapy in regards to optimizing overall nutrition and to support iraida himalayan pink salt for additional mineral content. Provide healthful meal ideas. Plan and Future Considerations:   Patient aware of risks and benefits and consented to medical nutrition therapy. Weight taken.     See patient instructions    No Suki Sleek

## 2018-11-26 ENCOUNTER — OFFICE VISIT (OUTPATIENT)
Dept: SURGERY | Facility: CLINIC | Age: 73
End: 2018-11-26
Payer: MEDICARE

## 2018-11-26 VITALS
HEART RATE: 97 BPM | SYSTOLIC BLOOD PRESSURE: 143 MMHG | DIASTOLIC BLOOD PRESSURE: 95 MMHG | OXYGEN SATURATION: 95 % | RESPIRATION RATE: 16 BRPM | BODY MASS INDEX: 33 KG/M2 | WEIGHT: 200 LBS

## 2018-11-26 DIAGNOSIS — K76.0 NAFLD (NONALCOHOLIC FATTY LIVER DISEASE): Primary | ICD-10-CM

## 2018-11-26 NOTE — PROGRESS NOTES
[facility] at [facility]  [address] [address], [address]  [address]  [phone] ([phone]) tobramycin-dexamethasone 0.3-0.1 % Ophthalmic Ointment, Apply under and on the left upper eyelid BID x 7 days, Disp: , Rfl:   •  Neomycin-Polymyxin-Dexameth 3.5-22410-3.1 Ophthalmic Ointment, , Disp: , Rfl: 1  •  Cholecalciferol (VITAMIN D) 2000 units Oral

## 2018-11-29 ENCOUNTER — HOSPITAL ENCOUNTER (OUTPATIENT)
Dept: ULTRASOUND IMAGING | Facility: HOSPITAL | Age: 73
Discharge: HOME | End: 2018-11-29
Attending: INTERNAL MEDICINE
Payer: MEDICARE

## 2018-11-29 DIAGNOSIS — K76.0 NAFLD (NONALCOHOLIC FATTY LIVER DISEASE): ICD-10-CM

## 2018-11-29 PROCEDURE — 76705 ECHO EXAM OF ABDOMEN: CPT | Performed by: INTERNAL MEDICINE

## 2018-11-29 PROCEDURE — 0346T US LIVER WITH ELASTOGRAPHY (CPT=76705,0346T): CPT | Performed by: INTERNAL MEDICINE

## 2018-12-06 ENCOUNTER — TELEPHONE (OUTPATIENT)
Dept: SURGERY | Facility: CLINIC | Age: 73
End: 2018-12-06

## 2018-12-06 NOTE — TELEPHONE ENCOUNTER
Elastography with no significant fibrosis. Given her age there is little concern for development of any significant liver disease over time. She is working on getting old CT imaging for comparison.

## 2019-01-10 ENCOUNTER — OFFICE VISIT (OUTPATIENT)
Dept: FAMILY MEDICINE CLINIC | Facility: CLINIC | Age: 74
End: 2019-01-10
Payer: MEDICARE

## 2019-01-10 VITALS
WEIGHT: 198 LBS | HEART RATE: 91 BPM | DIASTOLIC BLOOD PRESSURE: 98 MMHG | OXYGEN SATURATION: 95 % | SYSTOLIC BLOOD PRESSURE: 130 MMHG | HEIGHT: 65 IN | BODY MASS INDEX: 32.99 KG/M2

## 2019-01-10 DIAGNOSIS — E78.00 PURE HYPERCHOLESTEROLEMIA: ICD-10-CM

## 2019-01-10 DIAGNOSIS — R29.898 WEAKNESS OF RIGHT ARM: ICD-10-CM

## 2019-01-10 DIAGNOSIS — R15.9 INCONTINENCE OF FECES WITH FECAL URGENCY: ICD-10-CM

## 2019-01-10 DIAGNOSIS — R15.2 INCONTINENCE OF FECES WITH FECAL URGENCY: ICD-10-CM

## 2019-01-10 DIAGNOSIS — K76.0 HEPATIC STEATOSIS: ICD-10-CM

## 2019-01-10 DIAGNOSIS — R73.03 PREDIABETES: Primary | ICD-10-CM

## 2019-01-10 DIAGNOSIS — M67.911 TENDINOPATHY OF RIGHT ROTATOR CUFF: ICD-10-CM

## 2019-01-10 PROCEDURE — 99214 OFFICE O/P EST MOD 30 MIN: CPT | Performed by: FAMILY MEDICINE

## 2019-01-10 NOTE — PATIENT INSTRUCTIONS
The products and items listed below (the “Products”)  and their claims have not been evaluated by the Food and Drug Administration. Dietary products are not intended to treat, prevent, mitigate or cure disease.  Ultimately, you must draw your own conclusion 8-12 Ounces of Water  1 Teaspoon of Apple Cider Vinegar  Juice of 1 Freshly Squeezed Lemon

## 2019-01-10 NOTE — PROGRESS NOTES
Clint Bentley is a 72year old female. Patient presents with: Follow - Up: meds       HPI:     Going low carb, low sugar, avoiding dairy. Eating gluten free   Seeing MNT  When avoiding dairy it helps her control her BM.   Has pain in her right shoulder - ha Financial resource strain: Not on file      Food insecurity - worry: Not on file      Food insecurity - inability: Not on file      Transportation needs - medical: Not on file      Transportation needs - non-medical: Not on file    Occupational History Neurological: She is alert and oriented to person, place, and time. No cranial nerve deficit. Gait normal.   Skin: Skin is warm and dry. Psychiatric: Affect normal.       ASSESSMENT AND PLAN:       1. Prediabetes    2. Pure hypercholesterolemia    3.  Inc The products and items listed below (the “Products”)  and their claims have not been evaluated by the Food and Drug Administration. Dietary products are not intended to treat, prevent, mitigate or cure disease.  Ultimately, you must draw your own conclusion 8-12 Ounces of Water  1 Teaspoon of Apple Cider Vinegar  Juice of 1 Freshly Squeezed Lemon              Return in about 4 months (around 5/10/2019) for Annual Wellness Visit - Medicare (30 min).     Patient affirmed understanding of plan and all questions w

## 2019-02-05 ENCOUNTER — TELEPHONE (OUTPATIENT)
Dept: FAMILY MEDICINE CLINIC | Facility: CLINIC | Age: 74
End: 2019-02-05

## 2019-03-01 ENCOUNTER — HOSPITAL ENCOUNTER (OUTPATIENT)
Dept: ULTRASOUND IMAGING | Facility: HOSPITAL | Age: 74
Discharge: HOME | End: 2019-03-01
Attending: INTERNAL MEDICINE
Payer: MEDICARE

## 2019-03-01 DIAGNOSIS — K76.0 NON-ALCOHOLIC FATTY LIVER DISEASE: ICD-10-CM

## 2019-04-08 ENCOUNTER — OFFICE VISIT (OUTPATIENT)
Dept: SURGERY | Facility: CLINIC | Age: 74
End: 2019-04-08
Payer: MEDICARE

## 2019-04-08 VITALS
HEART RATE: 108 BPM | WEIGHT: 197 LBS | OXYGEN SATURATION: 94 % | BODY MASS INDEX: 33 KG/M2 | SYSTOLIC BLOOD PRESSURE: 140 MMHG | DIASTOLIC BLOOD PRESSURE: 92 MMHG | RESPIRATION RATE: 16 BRPM

## 2019-04-08 NOTE — PROGRESS NOTES
[facility]  [address], [address]  [address]  [phone] ([phone]) (EUCRISA) 2 % External Ointment, Apply topically.  Apply 2 times per day to eyelids, Disp: , Rfl:   •  tobramycin-dexamethasone 0.3-0.1 % Ophthalmic Ointment, Apply under and on the left upper eyelid BID x 7 days, Disp: , Rfl:   •  Neomycin-Polymyxin-Dexame

## 2019-05-09 ENCOUNTER — OFFICE VISIT (OUTPATIENT)
Dept: FAMILY MEDICINE CLINIC | Facility: CLINIC | Age: 74
End: 2019-05-09
Payer: MEDICARE

## 2019-05-09 VITALS
BODY MASS INDEX: 32.82 KG/M2 | HEART RATE: 106 BPM | DIASTOLIC BLOOD PRESSURE: 80 MMHG | SYSTOLIC BLOOD PRESSURE: 130 MMHG | WEIGHT: 197 LBS | RESPIRATION RATE: 17 BRPM | OXYGEN SATURATION: 96 % | HEIGHT: 65 IN

## 2019-05-09 DIAGNOSIS — Z78.0 POSTMENOPAUSAL: ICD-10-CM

## 2019-05-09 DIAGNOSIS — E66.9 OBESITY (BMI 30-39.9): ICD-10-CM

## 2019-05-09 DIAGNOSIS — Z00.00 ROUTINE MEDICAL EXAM: Primary | ICD-10-CM

## 2019-05-09 DIAGNOSIS — K76.0 HEPATIC STEATOSIS: ICD-10-CM

## 2019-05-09 DIAGNOSIS — E78.00 PURE HYPERCHOLESTEROLEMIA: ICD-10-CM

## 2019-05-09 DIAGNOSIS — E55.9 VITAMIN D DEFICIENCY: ICD-10-CM

## 2019-05-09 DIAGNOSIS — R73.03 PREDIABETES: ICD-10-CM

## 2019-05-09 PROCEDURE — G0439 PPPS, SUBSEQ VISIT: HCPCS | Performed by: FAMILY MEDICINE

## 2019-05-09 RX ORDER — ALPRAZOLAM 0.25 MG/1
TABLET ORAL
Refills: 0 | COMMUNITY
Start: 2019-04-11 | End: 2019-05-09

## 2019-05-09 RX ORDER — IBUPROFEN 200 MG
200 TABLET ORAL
COMMUNITY
End: 2019-12-23

## 2019-05-09 RX ORDER — ACETAMINOPHEN AND CODEINE PHOSPHATE 300; 30 MG/1; MG/1
1-2 TABLET ORAL
COMMUNITY
Start: 2018-05-02 | End: 2019-05-09

## 2019-05-09 RX ORDER — DOXYCYCLINE HYCLATE 50 MG/1
CAPSULE ORAL
Refills: 0 | COMMUNITY
Start: 2019-02-25 | End: 2019-05-09

## 2019-05-09 RX ORDER — MELOXICAM 15 MG/1
TABLET ORAL
Refills: 0 | COMMUNITY
Start: 2019-01-24 | End: 2019-05-09

## 2019-05-09 RX ORDER — ZOSTER VACCINE RECOMBINANT, ADJUVANTED 50 MCG/0.5
KIT INTRAMUSCULAR
Refills: 0 | COMMUNITY
Start: 2019-01-11 | End: 2019-05-09

## 2019-05-09 NOTE — PROGRESS NOTES
HPI:   Sage Chaudhry is a 72year old female who presents for a Medicare Subsequent Annual Wellness visit (Pt already had Initial Annual Wellness). Still with Rt shoulder pain - has been seeing Dr. Jude Her. Did PT and had improvement.    Will  Continue to Face to Face with patient and Family/surrogate (if present), and forms available to patient in AVS         She has never smoked tobacco.    CAGE Alcohol screening   Viki Gipson was screened for Alcohol abuse and had a score of 0 so is at low risk.     Yaa (Right). Her family history includes Heart Disease in her brother and father. SOCIAL HISTORY:   She  reports that she has never smoked. She has never used smokeless tobacco. She reports that she drinks alcohol. She reports that she does not use drugs. DIFFERENTIAL WITH PLATELET; Future  -     HEMOGLOBIN A1C; Future  -     VITAMIN D, 25-HYDROXY; Future    Postmenopausal  -     XR DEXA BONE DENSITOMETRY (CPT=34880); Future  -     CBC WITH DIFFERENTIAL WITH PLATELET;  Future  -     VITAMIN D, 25-HYDROXY; Fu visit. No flowsheet data found. Pap and Pelvic      Pap: Every 3 yrs age 21-68 or Pap+HPV every 5 yrs age 33-67, age 72 and older at high risk There are no preventive care reminders to display for this patient.  Update 2Checkout if applicable

## 2019-05-14 ENCOUNTER — LAB ENCOUNTER (OUTPATIENT)
Dept: LAB | Facility: HOSPITAL | Age: 74
End: 2019-05-14
Attending: FAMILY MEDICINE
Payer: MEDICARE

## 2019-05-14 DIAGNOSIS — E78.00 PURE HYPERCHOLESTEROLEMIA: ICD-10-CM

## 2019-05-14 DIAGNOSIS — K76.0 HEPATIC STEATOSIS: ICD-10-CM

## 2019-05-14 DIAGNOSIS — Z00.00 ROUTINE MEDICAL EXAM: ICD-10-CM

## 2019-05-14 DIAGNOSIS — E66.9 OBESITY (BMI 30-39.9): ICD-10-CM

## 2019-05-14 DIAGNOSIS — Z78.0 POSTMENOPAUSAL: ICD-10-CM

## 2019-05-14 DIAGNOSIS — E55.9 VITAMIN D DEFICIENCY: ICD-10-CM

## 2019-05-14 DIAGNOSIS — R73.03 PREDIABETES: ICD-10-CM

## 2019-05-14 PROCEDURE — 80053 COMPREHEN METABOLIC PANEL: CPT

## 2019-05-14 PROCEDURE — 36415 COLL VENOUS BLD VENIPUNCTURE: CPT

## 2019-05-14 PROCEDURE — 82306 VITAMIN D 25 HYDROXY: CPT

## 2019-05-14 PROCEDURE — 80061 LIPID PANEL: CPT

## 2019-05-14 PROCEDURE — 85025 COMPLETE CBC W/AUTO DIFF WBC: CPT

## 2019-05-14 PROCEDURE — 83036 HEMOGLOBIN GLYCOSYLATED A1C: CPT

## 2019-05-16 ENCOUNTER — HOSPITAL ENCOUNTER (OUTPATIENT)
Dept: BONE DENSITY | Facility: HOSPITAL | Age: 74
Discharge: HOME | End: 2019-05-16
Attending: FAMILY MEDICINE
Payer: MEDICARE

## 2019-05-16 ENCOUNTER — TELEPHONE (OUTPATIENT)
Dept: FAMILY MEDICINE CLINIC | Facility: CLINIC | Age: 74
End: 2019-05-16

## 2019-05-16 DIAGNOSIS — Z78.0 POSTMENOPAUSAL: ICD-10-CM

## 2019-05-16 PROCEDURE — 77080 DXA BONE DENSITY AXIAL: CPT | Performed by: FAMILY MEDICINE

## 2019-05-16 NOTE — TELEPHONE ENCOUNTER
Test results provided to pt and per pt's request, test results with JA's recommendations mailed to pt.  Pt verbalized understanding and agrees with plan

## 2019-05-20 NOTE — TELEPHONE ENCOUNTER
Spoke to pt  regarding lab results. Pt has not received them through the mail yet but is aware of results. Pt will call back if she has any question regarding result notes.

## 2019-05-29 ENCOUNTER — TELEPHONE (OUTPATIENT)
Dept: FAMILY MEDICINE CLINIC | Facility: CLINIC | Age: 74
End: 2019-05-29

## 2019-05-30 NOTE — TELEPHONE ENCOUNTER
RN spoke with patient regarding bone density scan results being normal. She has asked for a copy of the dexa results which are being mailed to her today.

## 2019-12-23 ENCOUNTER — OFFICE VISIT (OUTPATIENT)
Dept: INTEGRATIVE MEDICINE | Facility: CLINIC | Age: 74
End: 2019-12-23
Payer: MEDICARE

## 2019-12-23 VITALS
WEIGHT: 196.63 LBS | HEIGHT: 65 IN | DIASTOLIC BLOOD PRESSURE: 74 MMHG | HEART RATE: 94 BPM | BODY MASS INDEX: 32.76 KG/M2 | OXYGEN SATURATION: 97 % | SYSTOLIC BLOOD PRESSURE: 126 MMHG

## 2019-12-23 DIAGNOSIS — E55.9 VITAMIN D DEFICIENCY: ICD-10-CM

## 2019-12-23 DIAGNOSIS — E78.00 PURE HYPERCHOLESTEROLEMIA: ICD-10-CM

## 2019-12-23 DIAGNOSIS — R73.03 PREDIABETES: Primary | ICD-10-CM

## 2019-12-23 DIAGNOSIS — E66.09 CLASS 1 OBESITY DUE TO EXCESS CALORIES WITHOUT SERIOUS COMORBIDITY IN ADULT, UNSPECIFIED BMI: ICD-10-CM

## 2019-12-23 PROCEDURE — 99214 OFFICE O/P EST MOD 30 MIN: CPT | Performed by: FAMILY MEDICINE

## 2019-12-23 NOTE — PROGRESS NOTES
Wade Reynoso is a 69year old female. Patient presents with: Follow - Up: 7 mos      HPI:     Having normal BM unless she has [place] food or foods that are too greasy or rich. Avoiding dairy mostly. Weight has been the same.  Not exercising regularly cur Social connections:        Talks on phone: Not on file        Gets together: Not on file        Attends [employer] service: Not on file        Active member of club or organization: Not on file        Attends meetings of clubs or organizations: Not on file cranial nerve deficit. Gait normal.   Skin: Skin is warm and dry. Psychiatric: Affect normal.       ASSESSMENT AND PLAN:       1. Prediabetes  - LIPID PANEL; Future  - VITAMIN D, 25-HYDROXY; Future  - HEMOGLOBIN A1C; Future    2.  Pure hypercholesterolemi

## 2020-01-27 ENCOUNTER — TELEPHONE (OUTPATIENT)
Dept: INTEGRATIVE MEDICINE | Facility: CLINIC | Age: 75
End: 2020-01-27

## 2020-01-30 ENCOUNTER — APPOINTMENT (OUTPATIENT)
Dept: LAB | Age: 75
End: 2020-01-30
Attending: FAMILY MEDICINE
Payer: MEDICARE

## 2020-01-30 DIAGNOSIS — R73.03 PREDIABETES: ICD-10-CM

## 2020-01-30 DIAGNOSIS — E66.09 CLASS 1 OBESITY DUE TO EXCESS CALORIES WITHOUT SERIOUS COMORBIDITY IN ADULT, UNSPECIFIED BMI: ICD-10-CM

## 2020-01-30 DIAGNOSIS — E78.00 PURE HYPERCHOLESTEROLEMIA: ICD-10-CM

## 2020-01-30 DIAGNOSIS — E55.9 VITAMIN D DEFICIENCY: ICD-10-CM

## 2020-01-30 LAB
CHOLEST SMN-MCNC: 249 MG/DL (ref ?–200)
EST. AVERAGE GLUCOSE BLD GHB EST-MCNC: 117 MG/DL (ref 68–126)
HBA1C MFR BLD HPLC: 5.7 % (ref ?–5.7)
HDLC SERPL-MCNC: 61 MG/DL (ref 40–59)
LDLC SERPL CALC-MCNC: 145 MG/DL (ref ?–100)
NONHDLC SERPL-MCNC: 188 MG/DL (ref ?–130)
PATIENT FASTING Y/N/NP: YES
TRIGL SERPL-MCNC: 213 MG/DL (ref 30–149)
VLDLC SERPL CALC-MCNC: 43 MG/DL (ref 0–30)

## 2020-01-30 PROCEDURE — 83036 HEMOGLOBIN GLYCOSYLATED A1C: CPT

## 2020-01-30 PROCEDURE — 80061 LIPID PANEL: CPT

## 2020-01-30 PROCEDURE — 82306 VITAMIN D 25 HYDROXY: CPT

## 2020-01-30 PROCEDURE — 36415 COLL VENOUS BLD VENIPUNCTURE: CPT

## 2020-01-31 LAB — 25(OH)D3 SERPL-MCNC: 13.6 NG/ML (ref 30–100)

## 2020-04-07 ENCOUNTER — TELEPHONE (OUTPATIENT)
Dept: SURGERY | Facility: CLINIC | Age: 75
End: 2020-04-07

## 2020-04-07 DIAGNOSIS — K76.9 LIVER LESION: Primary | ICD-10-CM

## 2020-04-07 NOTE — TELEPHONE ENCOUNTER
Phoned patient to communicate that due to the uncertainty regarding FPBTX-64 we are canceling all non critical appointments.   Let her know that we have reviewed her chart and recommend that her appointment be rescheduled for Oct, 2020 and that our office w

## 2020-06-25 ENCOUNTER — OFFICE VISIT (OUTPATIENT)
Dept: INTEGRATIVE MEDICINE | Facility: CLINIC | Age: 75
End: 2020-06-25
Payer: MEDICARE

## 2020-06-25 VITALS
WEIGHT: 198.38 LBS | OXYGEN SATURATION: 97 % | HEART RATE: 98 BPM | SYSTOLIC BLOOD PRESSURE: 140 MMHG | DIASTOLIC BLOOD PRESSURE: 96 MMHG | HEIGHT: 65 IN | BODY MASS INDEX: 33.05 KG/M2

## 2020-06-25 DIAGNOSIS — Z00.00 ROUTINE MEDICAL EXAM: Primary | ICD-10-CM

## 2020-06-25 DIAGNOSIS — R15.2 INCONTINENCE OF FECES WITH FECAL URGENCY: ICD-10-CM

## 2020-06-25 DIAGNOSIS — E78.00 PURE HYPERCHOLESTEROLEMIA: ICD-10-CM

## 2020-06-25 DIAGNOSIS — E55.9 VITAMIN D DEFICIENCY: ICD-10-CM

## 2020-06-25 DIAGNOSIS — R73.03 PREDIABETES: ICD-10-CM

## 2020-06-25 DIAGNOSIS — K76.0 HEPATIC STEATOSIS: ICD-10-CM

## 2020-06-25 DIAGNOSIS — E66.9 OBESITY (BMI 30-39.9): ICD-10-CM

## 2020-06-25 DIAGNOSIS — R03.0 PREHYPERTENSION: ICD-10-CM

## 2020-06-25 DIAGNOSIS — R15.9 INCONTINENCE OF FECES WITH FECAL URGENCY: ICD-10-CM

## 2020-06-25 PROCEDURE — G0439 PPPS, SUBSEQ VISIT: HCPCS | Performed by: FAMILY MEDICINE

## 2020-06-25 NOTE — PROGRESS NOTES
HPI:   Clint Bentley is a 70year old female who presents for a Medicare Subsequent Annual Wellness visit (Pt already had Initial Annual Wellness). Avoiding dairy as much as she can. Was furloughed and now back at work. Exercise - some walking.    [facility]    Patient Active Problem List:     Obesity due to excess calories     Pure hypercholesterolemia     Uterine carcinoma (HCC)     [facility]     Incontinence of feces with fecal urgency     Vitamin D Endocrine: Negative. Genitourinary: Negative. Musculoskeletal: Negative. Skin: Negative. Allergic/Immunologic: Negative. Neurological: Negative. Hematological: Negative. Psychiatric/Behavioral: Negative.            EXAM:   BP (!) 140/ ASSESSMENT AND OTHER RELEVANT CHRONIC CONDITIONS:   Pramod Garcia is a 70year old female who presents for a Medicare Assessment.      PLAN SUMMARY:   Diagnoses and all orders for this visit:    Routine medical exam  -     CBC WITH DIFFERENTIAL WITH PLATEL well-being?: Social Interaction; Visiting Friends      This section provided for quick review of chart, separate sheet to patient  [address] Internal Lab or Procedure External Lab or Procedure   Diabetes Screening after your 65th birthday    Pneumococcal 23 (Pneumovax)  Covered Once after 65 11/13/2016 Please get once after your 65th birthday    Hepatitis B for Moderate/High Risk No vaccine history found Medium/high risk factors:   End-stage renal disease   Hemophil

## 2020-08-06 ENCOUNTER — LAB ENCOUNTER (OUTPATIENT)
Dept: LAB | Facility: HOSPITAL | Age: 75
End: 2020-08-06
Attending: FAMILY MEDICINE
Payer: MEDICARE

## 2020-08-06 DIAGNOSIS — E78.00 PURE HYPERCHOLESTEROLEMIA: ICD-10-CM

## 2020-08-06 DIAGNOSIS — R15.9 INCONTINENCE OF FECES WITH FECAL URGENCY: ICD-10-CM

## 2020-08-06 DIAGNOSIS — E66.9 OBESITY (BMI 30-39.9): ICD-10-CM

## 2020-08-06 DIAGNOSIS — Z00.00 ROUTINE MEDICAL EXAM: ICD-10-CM

## 2020-08-06 DIAGNOSIS — R15.2 INCONTINENCE OF FECES WITH FECAL URGENCY: ICD-10-CM

## 2020-08-06 DIAGNOSIS — K76.0 HEPATIC STEATOSIS: ICD-10-CM

## 2020-08-06 DIAGNOSIS — E55.9 VITAMIN D DEFICIENCY: ICD-10-CM

## 2020-08-06 DIAGNOSIS — R73.03 PREDIABETES: ICD-10-CM

## 2020-08-06 DIAGNOSIS — R03.0 PREHYPERTENSION: ICD-10-CM

## 2020-08-06 LAB
ALBUMIN SERPL-MCNC: 3.4 G/DL (ref 3.4–5)
ALBUMIN/GLOB SERPL: 0.9 {RATIO} (ref 1–2)
ALP LIVER SERPL-CCNC: 93 U/L (ref 55–142)
ALT SERPL-CCNC: 34 U/L (ref 13–56)
ANION GAP SERPL CALC-SCNC: 4 MMOL/L (ref 0–18)
AST SERPL-CCNC: 21 U/L (ref 15–37)
BASOPHILS # BLD AUTO: 0.04 X10(3) UL (ref 0–0.2)
BASOPHILS NFR BLD AUTO: 0.9 %
BILIRUB SERPL-MCNC: 0.9 MG/DL (ref 0.1–2)
BUN BLD-MCNC: 19 MG/DL (ref 7–18)
BUN/CREAT SERPL: 22.1 (ref 10–20)
CALCIUM BLD-MCNC: 9 MG/DL (ref 8.5–10.1)
CHLORIDE SERPL-SCNC: 108 MMOL/L (ref 98–112)
CHOLEST SMN-MCNC: 234 MG/DL (ref ?–200)
CO2 SERPL-SCNC: 29 MMOL/L (ref 21–32)
CREAT BLD-MCNC: 0.86 MG/DL (ref 0.55–1.02)
DEPRECATED RDW RBC AUTO: 43.5 FL (ref 35.1–46.3)
EOSINOPHIL # BLD AUTO: 0.12 X10(3) UL (ref 0–0.7)
EOSINOPHIL NFR BLD AUTO: 2.6 %
ERYTHROCYTE [DISTWIDTH] IN BLOOD BY AUTOMATED COUNT: 12.4 % (ref 11–15)
EST. AVERAGE GLUCOSE BLD GHB EST-MCNC: 120 MG/DL (ref 68–126)
GLOBULIN PLAS-MCNC: 3.7 G/DL (ref 2.8–4.4)
GLUCOSE BLD-MCNC: 97 MG/DL (ref 70–99)
HBA1C MFR BLD HPLC: 5.8 % (ref ?–5.7)
HCT VFR BLD AUTO: 46.1 % (ref 35–48)
HDLC SERPL-MCNC: 65 MG/DL (ref 40–59)
HGB BLD-MCNC: 15.7 G/DL (ref 12–16)
IMM GRANULOCYTES # BLD AUTO: 0 X10(3) UL (ref 0–1)
IMM GRANULOCYTES NFR BLD: 0 %
LDLC SERPL CALC-MCNC: 135 MG/DL (ref ?–100)
LYMPHOCYTES # BLD AUTO: 1.43 X10(3) UL (ref 1–4)
LYMPHOCYTES NFR BLD AUTO: 30.7 %
M PROTEIN MFR SERPL ELPH: 7.1 G/DL (ref 6.4–8.2)
MCH RBC QN AUTO: 32.3 PG (ref 26–34)
MCHC RBC AUTO-ENTMCNC: 34.1 G/DL (ref 31–37)
MCV RBC AUTO: 94.9 FL (ref 80–100)
MONOCYTES # BLD AUTO: 0.4 X10(3) UL (ref 0.1–1)
MONOCYTES NFR BLD AUTO: 8.6 %
NEUTROPHILS # BLD AUTO: 2.67 X10 (3) UL (ref 1.5–7.7)
NEUTROPHILS # BLD AUTO: 2.67 X10(3) UL (ref 1.5–7.7)
NEUTROPHILS NFR BLD AUTO: 57.2 %
NONHDLC SERPL-MCNC: 169 MG/DL (ref ?–130)
OSMOLALITY SERPL CALC.SUM OF ELEC: 294 MOSM/KG (ref 275–295)
PATIENT FASTING Y/N/NP: YES
PATIENT FASTING Y/N/NP: YES
PLATELET # BLD AUTO: 275 10(3)UL (ref 150–450)
POTASSIUM SERPL-SCNC: 3.9 MMOL/L (ref 3.5–5.1)
RBC # BLD AUTO: 4.86 X10(6)UL (ref 3.8–5.3)
SODIUM SERPL-SCNC: 141 MMOL/L (ref 136–145)
TRIGL SERPL-MCNC: 171 MG/DL (ref 30–149)
TSI SER-ACNC: 2.67 MIU/ML (ref 0.36–3.74)
VLDLC SERPL CALC-MCNC: 34 MG/DL (ref 0–30)
WBC # BLD AUTO: 4.7 X10(3) UL (ref 4–11)

## 2020-08-06 PROCEDURE — 80061 LIPID PANEL: CPT

## 2020-08-06 PROCEDURE — 82306 VITAMIN D 25 HYDROXY: CPT

## 2020-08-06 PROCEDURE — 83036 HEMOGLOBIN GLYCOSYLATED A1C: CPT

## 2020-08-06 PROCEDURE — 84443 ASSAY THYROID STIM HORMONE: CPT

## 2020-08-06 PROCEDURE — 85025 COMPLETE CBC W/AUTO DIFF WBC: CPT

## 2020-08-06 PROCEDURE — 36415 COLL VENOUS BLD VENIPUNCTURE: CPT

## 2020-08-06 PROCEDURE — 80053 COMPREHEN METABOLIC PANEL: CPT

## 2020-08-07 LAB — 25(OH)D3 SERPL-MCNC: 18.5 NG/ML (ref 30–100)

## 2020-08-11 ENCOUNTER — TELEPHONE (OUTPATIENT)
Dept: INTEGRATIVE MEDICINE | Facility: CLINIC | Age: 75
End: 2020-08-11

## 2020-08-11 NOTE — TELEPHONE ENCOUNTER
RN spoke with patient and confirmed her appt with Dr Candelaria Ruiz for Dec 17, 2020, RN also reviewed labs and assured her that her results were put in the mail.

## 2020-09-22 ENCOUNTER — TELEPHONE (OUTPATIENT)
Dept: SURGERY | Facility: CLINIC | Age: 75
End: 2020-09-22

## 2020-09-22 NOTE — TELEPHONE ENCOUNTER
Patient wanted to know if any labs or imaging was needed for upcoming appt. Communicated that no follow-up imaging was needed prior to appointment. Also communicated that labs done in late August could be used for the visit.     BELLA Mo  Nurse

## 2020-11-23 ENCOUNTER — OFFICE VISIT (OUTPATIENT)
Dept: SURGERY | Facility: CLINIC | Age: 75
End: 2020-11-23
Payer: MEDICARE

## 2020-11-23 VITALS
HEART RATE: 91 BPM | BODY MASS INDEX: 32.55 KG/M2 | HEIGHT: 65 IN | DIASTOLIC BLOOD PRESSURE: 89 MMHG | WEIGHT: 195.38 LBS | RESPIRATION RATE: 16 BRPM | OXYGEN SATURATION: 95 % | SYSTOLIC BLOOD PRESSURE: 149 MMHG

## 2020-11-23 DIAGNOSIS — K76.9 LIVER LESION: Primary | ICD-10-CM

## 2020-11-23 DIAGNOSIS — K76.0 NAFLD (NONALCOHOLIC FATTY LIVER DISEASE): ICD-10-CM

## 2020-11-23 RX ORDER — SWAB
SWAB, NON-MEDICATED MISCELLANEOUS
COMMUNITY
End: 2021-09-29

## 2020-12-17 ENCOUNTER — OFFICE VISIT (OUTPATIENT)
Dept: INTEGRATIVE MEDICINE | Facility: CLINIC | Age: 75
End: 2020-12-17
Payer: MEDICARE

## 2020-12-17 VITALS
SYSTOLIC BLOOD PRESSURE: 136 MMHG | OXYGEN SATURATION: 97 % | DIASTOLIC BLOOD PRESSURE: 88 MMHG | BODY MASS INDEX: 33.59 KG/M2 | WEIGHT: 201.63 LBS | HEIGHT: 65 IN | HEART RATE: 95 BPM

## 2020-12-17 DIAGNOSIS — E66.09 CLASS 1 OBESITY DUE TO EXCESS CALORIES WITHOUT SERIOUS COMORBIDITY WITH BODY MASS INDEX (BMI) OF 33.0 TO 33.9 IN ADULT: ICD-10-CM

## 2020-12-17 DIAGNOSIS — R15.9 INCONTINENCE OF FECES WITH FECAL URGENCY: ICD-10-CM

## 2020-12-17 DIAGNOSIS — R03.0 PREHYPERTENSION: ICD-10-CM

## 2020-12-17 DIAGNOSIS — R15.2 INCONTINENCE OF FECES WITH FECAL URGENCY: ICD-10-CM

## 2020-12-17 DIAGNOSIS — C55 UTERINE CARCINOMA (HCC): ICD-10-CM

## 2020-12-17 DIAGNOSIS — R73.03 PREDIABETES: ICD-10-CM

## 2020-12-17 DIAGNOSIS — C54.1 ENDOMETRIAL CANCER (HCC): ICD-10-CM

## 2020-12-17 DIAGNOSIS — Z00.00 ROUTINE MEDICAL EXAM: Primary | ICD-10-CM

## 2020-12-17 DIAGNOSIS — M15.1 HEBERDEN'S NODES OF RIGHT HAND: ICD-10-CM

## 2020-12-17 DIAGNOSIS — E78.00 PURE HYPERCHOLESTEROLEMIA: ICD-10-CM

## 2020-12-17 DIAGNOSIS — R16.0 LIVER MASS: ICD-10-CM

## 2020-12-17 DIAGNOSIS — K76.0 HEPATIC STEATOSIS: ICD-10-CM

## 2020-12-17 PROCEDURE — 99214 OFFICE O/P EST MOD 30 MIN: CPT | Performed by: FAMILY MEDICINE

## 2020-12-17 NOTE — PROGRESS NOTES
HPI:   Wade Reynoso is a 70year old female who presents for a Medicare Subsequent Annual Wellness visit (Pt already had Initial Annual Wellness). Has a growth on her right index finger, came up a few weeks ago. Keeps banging it.    Still working 2 days pe excess calories     Pure hypercholesterolemia     Uterine carcinoma (HCC)     Endometrial cancer (HCC)     Incontinence of feces with fecal urgency     Vitamin D deficiency     Prediabetes     Liver mass     Hepatic steatosis    Wt Readings from Last 3 Enc Neurological: Negative. Hematological: Negative. Psychiatric/Behavioral: Negative.            EXAM:   BP (!) 158/98   Pulse 95   Ht 5' 5\" (1.651 m)   Wt 201 lb 9.6 oz (91.4 kg)   SpO2 97%   BMI 33.55 kg/m²  Estimated body mass index is 33.55 kg/m² membrane and ear canal normal.   Nose: Nose normal.   Mouth/Throat: Oropharynx is clear and moist.   Eyes: Pupils are equal, round, and reactive to light. Conjunctivae and EOM are normal.   Neck: Normal range of motion. Neck supple.    Cardiovascular: Shelbi Riser cpm  11. Referred to ortho for further management. The patient indicates understanding of these issues and agrees to the plan. Reinforced healthy diet, lifestyle, and exercise.     Return in about 6 months (around 6/17/2021) for Integrative Medicine - Es flowsheet data found. Pap and Pelvic      Pap: Every 3 yrs age 21-68 or Pap+HPV every 5 yrs age 33-67, age 72 and older at high risk There are no preventive care reminders to display for this patient.  Update Health Maintenance if applicable    Chlamydia

## 2020-12-17 NOTE — PATIENT INSTRUCTIONS
I have complete bam in the body's ability to heal and transform. The products and items listed below (the “Products”)  and their claims have not been evaluated by the Food and Drug Administration.  Dietary products are not intended to treat, prevent, m

## 2021-01-01 NOTE — TELEPHONE ENCOUNTER
Pt calling, states she received a letter regarding her overdue labs. She states she will have them done ASAP; she had some problems at home which made it difficult to have them completed.  Pt reassured and instructed to complete soon if possible; pt verbali
Statement Selected

## 2021-02-06 DIAGNOSIS — Z23 NEED FOR VACCINATION: ICD-10-CM

## 2021-02-08 ENCOUNTER — IMMUNIZATION (OUTPATIENT)
Dept: LAB | Age: 76
End: 2021-02-08
Attending: HOSPITALIST
Payer: MEDICARE

## 2021-02-08 DIAGNOSIS — Z23 NEED FOR VACCINATION: Primary | ICD-10-CM

## 2021-02-08 PROCEDURE — 0001A SARSCOV2 VAC 30MCG/0.3ML IM: CPT

## 2021-03-01 ENCOUNTER — IMMUNIZATION (OUTPATIENT)
Dept: LAB | Age: 76
End: 2021-03-01
Attending: HOSPITALIST
Payer: MEDICARE

## 2021-03-01 DIAGNOSIS — Z23 NEED FOR VACCINATION: Primary | ICD-10-CM

## 2021-03-01 PROCEDURE — 0002A SARSCOV2 VAC 30MCG/0.3ML IM: CPT

## 2021-04-22 ENCOUNTER — OFFICE VISIT (OUTPATIENT)
Dept: SURGERY | Facility: CLINIC | Age: 76
End: 2021-04-22
Payer: MEDICARE

## 2021-04-22 DIAGNOSIS — M67.441 GANGLION OF RIGHT HAND: Primary | ICD-10-CM

## 2021-04-22 PROCEDURE — 99204 OFFICE O/P NEW MOD 45 MIN: CPT | Performed by: PLASTIC SURGERY

## 2021-04-22 NOTE — H&P
Para Libman is a 70year old female that presents with Patient presents with:  Cyst: RIF Dorsal      REFERRED BY:  Elia Saunders    Pacemaker: No  Latex Allergy: no  Coumadin: No  Plavix: No  Other anticoagulants: No  Cardiac stents: No    HAND DOMINANCE:  R • Heart Disease Father    • Heart Disease Brother           PHYSICAL EXAM:     CONSTITUTIONAL: Overall appearance - Normal  HEENT: Normocephalic  EYES: Conjunctiva - Right: Normal, Left: Normal; EOMI  EARS: Inspection - Right: Normal, Left: Normal  NECK/

## 2021-09-21 NOTE — MR AVS SNAPSHOT
[address], [address] for [facility]  [address], [address]  [address]  [phone]               Thank you for choosing us for your health care visit with Edi Davidson DO.   We are glad to serve you and happy to provide Candida albicans infection        Vitamin D deficiency          Instructions and Information about Your Health    Avoid all dairy products. Omega 3 fatty acids are anti-inflammatory and important for brain and nervous system health, including memory.   So return for a follow-up Blood Pressure Check in 1 year.      Lifestyle Modification Recommendations:    Modification Recommendation   Weight Reduction Maintain normal body weight (body mass index 18.5-24.9 kg/m2)   DASH eating plan Adopt a diet rich in fruit 2 ½ hours per week – spread out over time Use a claudette to keep you motivated   Don’t forget strength training with weights and resistance Set goals and track your progress   You don’t need to join a gym. Home exercises work great.  Put more priority on exe Statement Selected

## 2021-10-16 ENCOUNTER — IMMUNIZATION (OUTPATIENT)
Dept: LAB | Facility: HOSPITAL | Age: 76
End: 2021-10-16
Attending: EMERGENCY MEDICINE
Payer: MEDICARE

## 2021-10-16 DIAGNOSIS — Z23 NEED FOR VACCINATION: Primary | ICD-10-CM

## 2021-10-16 PROCEDURE — 0003A SARSCOV2 VAC 30MCG/0.3ML IM: CPT

## 2021-10-26 ENCOUNTER — LAB ENCOUNTER (OUTPATIENT)
Dept: LAB | Age: 76
End: 2021-10-26
Attending: FAMILY MEDICINE
Payer: MEDICARE

## 2021-10-26 DIAGNOSIS — E66.09 CLASS 1 OBESITY DUE TO EXCESS CALORIES WITHOUT SERIOUS COMORBIDITY WITH BODY MASS INDEX (BMI) OF 33.0 TO 33.9 IN ADULT: ICD-10-CM

## 2021-10-26 DIAGNOSIS — R73.03 PREDIABETES: ICD-10-CM

## 2021-10-26 DIAGNOSIS — C54.1 ENDOMETRIAL CANCER (HCC): ICD-10-CM

## 2021-10-26 DIAGNOSIS — E78.00 PURE HYPERCHOLESTEROLEMIA: ICD-10-CM

## 2021-10-26 DIAGNOSIS — R15.2 INCONTINENCE OF FECES WITH FECAL URGENCY: ICD-10-CM

## 2021-10-26 DIAGNOSIS — K76.0 NAFLD (NONALCOHOLIC FATTY LIVER DISEASE): ICD-10-CM

## 2021-10-26 DIAGNOSIS — R16.0 LIVER MASS: ICD-10-CM

## 2021-10-26 DIAGNOSIS — M15.1 HEBERDEN'S NODES OF RIGHT HAND: ICD-10-CM

## 2021-10-26 DIAGNOSIS — C55 UTERINE CARCINOMA (HCC): ICD-10-CM

## 2021-10-26 DIAGNOSIS — R03.0 PREHYPERTENSION: ICD-10-CM

## 2021-10-26 DIAGNOSIS — K76.0 HEPATIC STEATOSIS: ICD-10-CM

## 2021-10-26 DIAGNOSIS — R15.9 INCONTINENCE OF FECES WITH FECAL URGENCY: ICD-10-CM

## 2021-10-26 DIAGNOSIS — Z00.00 ROUTINE MEDICAL EXAM: ICD-10-CM

## 2021-10-26 PROCEDURE — 36415 COLL VENOUS BLD VENIPUNCTURE: CPT

## 2021-10-26 PROCEDURE — 82306 VITAMIN D 25 HYDROXY: CPT

## 2021-10-26 PROCEDURE — 80061 LIPID PANEL: CPT

## 2021-10-26 PROCEDURE — 85025 COMPLETE CBC W/AUTO DIFF WBC: CPT

## 2021-10-26 PROCEDURE — 80053 COMPREHEN METABOLIC PANEL: CPT

## 2021-10-26 PROCEDURE — 82248 BILIRUBIN DIRECT: CPT

## 2021-10-26 PROCEDURE — 83036 HEMOGLOBIN GLYCOSYLATED A1C: CPT

## 2021-11-21 NOTE — PROGRESS NOTES
[facility] at [facility]  [address] [facility], [address]  [address]  [phone] ([phone]) outpatient medications on file.      Allergies:   Iodine (Topical)        HIVES    Social History    Socioeconomic History      Marital status:     Tobacco Use      Smoking status: Never Smoker      Smokeless tobacco: Never Used    Vaping Use      Va

## 2021-11-22 ENCOUNTER — OFFICE VISIT (OUTPATIENT)
Dept: SURGERY | Facility: CLINIC | Age: 76
End: 2021-11-22
Payer: MEDICARE

## 2021-11-22 VITALS
OXYGEN SATURATION: 97 % | HEART RATE: 85 BPM | WEIGHT: 199 LBS | SYSTOLIC BLOOD PRESSURE: 153 MMHG | BODY MASS INDEX: 33 KG/M2 | RESPIRATION RATE: 16 BRPM | DIASTOLIC BLOOD PRESSURE: 84 MMHG

## 2021-11-22 DIAGNOSIS — K76.0 NAFLD (NONALCOHOLIC FATTY LIVER DISEASE): Primary | ICD-10-CM

## 2022-02-10 ENCOUNTER — LAB ENCOUNTER (OUTPATIENT)
Dept: LAB | Age: 77
End: 2022-02-10
Attending: INTERNAL MEDICINE
Payer: MEDICARE

## 2022-02-10 ENCOUNTER — OFFICE VISIT (OUTPATIENT)
Dept: INTERNAL MEDICINE CLINIC | Facility: CLINIC | Age: 77
End: 2022-02-10
Payer: MEDICARE

## 2022-02-10 VITALS
HEART RATE: 86 BPM | DIASTOLIC BLOOD PRESSURE: 91 MMHG | RESPIRATION RATE: 18 BRPM | SYSTOLIC BLOOD PRESSURE: 148 MMHG | HEIGHT: 65 IN | WEIGHT: 196 LBS | BODY MASS INDEX: 32.65 KG/M2

## 2022-02-10 DIAGNOSIS — R73.03 PREDIABETES: ICD-10-CM

## 2022-02-10 DIAGNOSIS — I10 PRIMARY HYPERTENSION: ICD-10-CM

## 2022-02-10 DIAGNOSIS — I10 PRIMARY HYPERTENSION: Primary | ICD-10-CM

## 2022-02-10 DIAGNOSIS — E78.00 PURE HYPERCHOLESTEROLEMIA: ICD-10-CM

## 2022-02-10 DIAGNOSIS — E55.9 VITAMIN D DEFICIENCY: ICD-10-CM

## 2022-02-10 LAB
ALBUMIN SERPL-MCNC: 3.6 G/DL (ref 3.4–5)
ALBUMIN/GLOB SERPL: 1 {RATIO} (ref 1–2)
ALP LIVER SERPL-CCNC: 100 U/L
ALT SERPL-CCNC: 29 U/L
ANION GAP SERPL CALC-SCNC: 11 MMOL/L (ref 0–18)
AST SERPL-CCNC: 19 U/L (ref 15–37)
BILIRUB SERPL-MCNC: 0.7 MG/DL (ref 0.1–2)
BUN BLD-MCNC: 18 MG/DL (ref 7–18)
BUN/CREAT SERPL: 24.7 (ref 10–20)
CALCIUM BLD-MCNC: 9.2 MG/DL (ref 8.5–10.1)
CHLORIDE SERPL-SCNC: 107 MMOL/L (ref 98–112)
CHOLEST SERPL-MCNC: 255 MG/DL (ref ?–200)
CO2 SERPL-SCNC: 22 MMOL/L (ref 21–32)
CREAT BLD-MCNC: 0.73 MG/DL
EST. AVERAGE GLUCOSE BLD GHB EST-MCNC: 117 MG/DL (ref 68–126)
FASTING PATIENT LIPID ANSWER: YES
FASTING STATUS PATIENT QL REPORTED: YES
GLOBULIN PLAS-MCNC: 3.6 G/DL (ref 2.8–4.4)
GLUCOSE BLD-MCNC: 97 MG/DL (ref 70–99)
HBA1C MFR BLD: 5.7 % (ref ?–5.7)
HDLC SERPL-MCNC: 71 MG/DL (ref 40–59)
LDLC SERPL CALC-MCNC: 161 MG/DL (ref ?–100)
NONHDLC SERPL-MCNC: 184 MG/DL (ref ?–130)
OSMOLALITY SERPL CALC.SUM OF ELEC: 292 MOSM/KG (ref 275–295)
POTASSIUM SERPL-SCNC: 4.1 MMOL/L (ref 3.5–5.1)
PROT SERPL-MCNC: 7.2 G/DL (ref 6.4–8.2)
SODIUM SERPL-SCNC: 140 MMOL/L (ref 136–145)
TRIGL SERPL-MCNC: 132 MG/DL (ref 30–149)
TSI SER-ACNC: 4.69 MIU/ML (ref 0.36–3.74)
VIT D+METAB SERPL-MCNC: 20.2 NG/ML (ref 30–100)
VLDLC SERPL CALC-MCNC: 26 MG/DL (ref 0–30)

## 2022-02-10 PROCEDURE — 82306 VITAMIN D 25 HYDROXY: CPT

## 2022-02-10 PROCEDURE — 83036 HEMOGLOBIN GLYCOSYLATED A1C: CPT

## 2022-02-10 PROCEDURE — 80053 COMPREHEN METABOLIC PANEL: CPT

## 2022-02-10 PROCEDURE — 80061 LIPID PANEL: CPT

## 2022-02-10 PROCEDURE — 36415 COLL VENOUS BLD VENIPUNCTURE: CPT

## 2022-02-10 PROCEDURE — 84443 ASSAY THYROID STIM HORMONE: CPT

## 2022-02-10 PROCEDURE — 99204 OFFICE O/P NEW MOD 45 MIN: CPT | Performed by: INTERNAL MEDICINE

## 2022-02-18 RX ORDER — ERGOCALCIFEROL 1.25 MG/1
50000 CAPSULE ORAL WEEKLY
Qty: 4 CAPSULE | Refills: 3 | Status: SHIPPED | OUTPATIENT
Start: 2022-02-18

## 2022-04-18 ENCOUNTER — TELEPHONE (OUTPATIENT)
Dept: INTERNAL MEDICINE CLINIC | Facility: CLINIC | Age: 77
End: 2022-04-18

## 2022-04-20 ENCOUNTER — IMMUNIZATION (OUTPATIENT)
Dept: LAB | Age: 77
End: 2022-04-20
Attending: EMERGENCY MEDICINE
Payer: MEDICARE

## 2022-04-20 DIAGNOSIS — Z23 NEED FOR VACCINATION: Primary | ICD-10-CM

## 2022-04-20 PROCEDURE — 0054A SARSCOV2 VAC 30MCG TRS SUCR: CPT

## 2022-04-29 ENCOUNTER — LAB ENCOUNTER (OUTPATIENT)
Dept: LAB | Age: 77
End: 2022-04-29
Attending: INTERNAL MEDICINE
Payer: MEDICARE

## 2022-04-29 DIAGNOSIS — R79.89 ELEVATED TSH: ICD-10-CM

## 2022-04-29 DIAGNOSIS — E78.5 HYPERLIPIDEMIA, UNSPECIFIED HYPERLIPIDEMIA TYPE: ICD-10-CM

## 2022-04-29 LAB
CHOLEST SERPL-MCNC: 223 MG/DL (ref ?–200)
FASTING PATIENT LIPID ANSWER: YES
HDLC SERPL-MCNC: 57 MG/DL (ref 40–59)
LDLC SERPL CALC-MCNC: 133 MG/DL (ref ?–100)
NONHDLC SERPL-MCNC: 166 MG/DL (ref ?–130)
T4 FREE SERPL-MCNC: 1 NG/DL (ref 0.8–1.7)
TRIGL SERPL-MCNC: 185 MG/DL (ref 30–149)
TSI SER-ACNC: 3.64 MIU/ML (ref 0.36–3.74)
VLDLC SERPL CALC-MCNC: 34 MG/DL (ref 0–30)

## 2022-04-29 PROCEDURE — 84443 ASSAY THYROID STIM HORMONE: CPT

## 2022-04-29 PROCEDURE — 84439 ASSAY OF FREE THYROXINE: CPT

## 2022-04-29 PROCEDURE — 36415 COLL VENOUS BLD VENIPUNCTURE: CPT

## 2022-04-29 PROCEDURE — 80061 LIPID PANEL: CPT

## 2022-05-05 ENCOUNTER — OFFICE VISIT (OUTPATIENT)
Dept: INTERNAL MEDICINE CLINIC | Facility: CLINIC | Age: 77
End: 2022-05-05
Payer: MEDICARE

## 2022-05-05 VITALS
HEART RATE: 91 BPM | HEIGHT: 65 IN | SYSTOLIC BLOOD PRESSURE: 127 MMHG | DIASTOLIC BLOOD PRESSURE: 85 MMHG | WEIGHT: 192 LBS | BODY MASS INDEX: 31.99 KG/M2

## 2022-05-05 DIAGNOSIS — R15.9 INCONTINENCE OF FECES, UNSPECIFIED FECAL INCONTINENCE TYPE: ICD-10-CM

## 2022-05-05 DIAGNOSIS — E55.9 VITAMIN D DEFICIENCY: Primary | ICD-10-CM

## 2022-05-05 DIAGNOSIS — R73.03 PREDIABETES: ICD-10-CM

## 2022-05-05 DIAGNOSIS — E78.00 PURE HYPERCHOLESTEROLEMIA: ICD-10-CM

## 2022-05-05 DIAGNOSIS — R94.6 ABNORMAL THYROID FUNCTION TEST: ICD-10-CM

## 2022-05-05 DIAGNOSIS — Z78.0 ASYMPTOMATIC MENOPAUSAL STATE: ICD-10-CM

## 2022-05-05 DIAGNOSIS — Z91.89 AT RISK FOR DECREASED BONE DENSITY: ICD-10-CM

## 2022-05-05 PROCEDURE — 99214 OFFICE O/P EST MOD 30 MIN: CPT | Performed by: INTERNAL MEDICINE

## 2022-06-01 RX ORDER — ERGOCALCIFEROL 1.25 MG/1
50000 CAPSULE ORAL WEEKLY
Qty: 4 CAPSULE | Refills: 0 | OUTPATIENT
Start: 2022-06-01

## 2022-06-24 ENCOUNTER — HOSPITAL ENCOUNTER (OUTPATIENT)
Dept: BONE DENSITY | Facility: HOSPITAL | Age: 77
Discharge: HOME | End: 2022-06-24
Attending: INTERNAL MEDICINE
Payer: MEDICARE

## 2022-06-24 DIAGNOSIS — Z91.89 AT RISK FOR DECREASED BONE DENSITY: ICD-10-CM

## 2022-06-24 DIAGNOSIS — Z78.0 ASYMPTOMATIC MENOPAUSAL STATE: ICD-10-CM

## 2022-06-24 PROCEDURE — 77080 DXA BONE DENSITY AXIAL: CPT | Performed by: INTERNAL MEDICINE

## 2022-08-05 ENCOUNTER — LAB ENCOUNTER (OUTPATIENT)
Dept: LAB | Age: 77
End: 2022-08-05
Attending: INTERNAL MEDICINE
Payer: MEDICARE

## 2022-08-05 ENCOUNTER — OFFICE VISIT (OUTPATIENT)
Dept: INTERNAL MEDICINE CLINIC | Facility: CLINIC | Age: 77
End: 2022-08-05
Payer: MEDICARE

## 2022-08-05 VITALS
WEIGHT: 195 LBS | HEIGHT: 65 IN | SYSTOLIC BLOOD PRESSURE: 128 MMHG | DIASTOLIC BLOOD PRESSURE: 84 MMHG | RESPIRATION RATE: 18 BRPM | HEART RATE: 99 BPM | BODY MASS INDEX: 32.49 KG/M2

## 2022-08-05 DIAGNOSIS — E78.00 PURE HYPERCHOLESTEROLEMIA: ICD-10-CM

## 2022-08-05 DIAGNOSIS — C54.1 ENDOMETRIAL CANCER (HCC): ICD-10-CM

## 2022-08-05 DIAGNOSIS — R16.0 LIVER MASS: ICD-10-CM

## 2022-08-05 DIAGNOSIS — R15.9 INCONTINENCE OF FECES WITH FECAL URGENCY: ICD-10-CM

## 2022-08-05 DIAGNOSIS — R73.03 PREDIABETES: ICD-10-CM

## 2022-08-05 DIAGNOSIS — Z00.00 ENCOUNTER FOR ANNUAL HEALTH EXAMINATION: ICD-10-CM

## 2022-08-05 DIAGNOSIS — E66.09 CLASS 1 OBESITY DUE TO EXCESS CALORIES WITHOUT SERIOUS COMORBIDITY WITH BODY MASS INDEX (BMI) OF 33.0 TO 33.9 IN ADULT: ICD-10-CM

## 2022-08-05 DIAGNOSIS — E55.9 VITAMIN D DEFICIENCY: ICD-10-CM

## 2022-08-05 DIAGNOSIS — K76.0 HEPATIC STEATOSIS: ICD-10-CM

## 2022-08-05 DIAGNOSIS — R15.2 INCONTINENCE OF FECES WITH FECAL URGENCY: ICD-10-CM

## 2022-08-05 DIAGNOSIS — I70.0 ATHEROSCLEROSIS OF AORTA (HCC): ICD-10-CM

## 2022-08-05 DIAGNOSIS — Z00.00 ENCOUNTER FOR ANNUAL HEALTH EXAMINATION: Primary | ICD-10-CM

## 2022-08-05 DIAGNOSIS — C55 UTERINE CARCINOMA (HCC): ICD-10-CM

## 2022-08-05 DIAGNOSIS — D69.2 SENILE PURPURA (HCC): ICD-10-CM

## 2022-08-05 LAB
CHOLEST SERPL-MCNC: 217 MG/DL (ref ?–200)
EST. AVERAGE GLUCOSE BLD GHB EST-MCNC: 111 MG/DL (ref 68–126)
FASTING PATIENT LIPID ANSWER: NO
HBA1C MFR BLD: 5.5 % (ref ?–5.7)
HDLC SERPL-MCNC: 58 MG/DL (ref 40–59)
LDLC SERPL CALC-MCNC: 130 MG/DL (ref ?–100)
NONHDLC SERPL-MCNC: 159 MG/DL (ref ?–130)
TRIGL SERPL-MCNC: 165 MG/DL (ref 30–149)
VIT D+METAB SERPL-MCNC: 20.5 NG/ML (ref 30–100)
VLDLC SERPL CALC-MCNC: 30 MG/DL (ref 0–30)

## 2022-08-05 PROCEDURE — 93010 ELECTROCARDIOGRAM REPORT: CPT | Performed by: INTERNAL MEDICINE

## 2022-08-05 PROCEDURE — G0439 PPPS, SUBSEQ VISIT: HCPCS | Performed by: INTERNAL MEDICINE

## 2022-08-05 PROCEDURE — 82306 VITAMIN D 25 HYDROXY: CPT

## 2022-08-05 PROCEDURE — 80061 LIPID PANEL: CPT

## 2022-08-05 PROCEDURE — 93005 ELECTROCARDIOGRAM TRACING: CPT

## 2022-08-05 PROCEDURE — 83036 HEMOGLOBIN GLYCOSYLATED A1C: CPT

## 2022-08-05 PROCEDURE — 36415 COLL VENOUS BLD VENIPUNCTURE: CPT

## 2022-08-11 RX ORDER — ERGOCALCIFEROL 1.25 MG/1
50000 CAPSULE ORAL
Qty: 12 CAPSULE | Refills: 0 | Status: SHIPPED | OUTPATIENT
Start: 2022-08-11 | End: 2022-10-28

## 2022-08-25 ENCOUNTER — HOSPITAL ENCOUNTER (OUTPATIENT)
Dept: ULTRASOUND IMAGING | Facility: HOSPITAL | Age: 77
Discharge: HOME | End: 2022-08-25
Attending: INTERNAL MEDICINE
Payer: MEDICARE

## 2022-08-25 DIAGNOSIS — K76.0 HEPATIC STEATOSIS: ICD-10-CM

## 2022-08-25 DIAGNOSIS — R16.0 LIVER MASS: ICD-10-CM

## 2022-08-25 PROCEDURE — 76981 USE PARENCHYMA: CPT | Performed by: INTERNAL MEDICINE

## 2022-08-25 PROCEDURE — 76705 ECHO EXAM OF ABDOMEN: CPT | Performed by: INTERNAL MEDICINE

## 2022-10-04 ENCOUNTER — MED REC SCAN ONLY (OUTPATIENT)
Dept: INTERNAL MEDICINE CLINIC | Facility: CLINIC | Age: 77
End: 2022-10-04

## 2022-12-12 ENCOUNTER — LAB ENCOUNTER (OUTPATIENT)
Dept: LAB | Facility: HOSPITAL | Age: 77
End: 2022-12-12
Attending: INTERNAL MEDICINE
Payer: MEDICARE

## 2022-12-12 ENCOUNTER — OFFICE VISIT (OUTPATIENT)
Dept: SURGERY | Facility: CLINIC | Age: 77
End: 2022-12-12

## 2022-12-12 VITALS
BODY MASS INDEX: 32.65 KG/M2 | RESPIRATION RATE: 16 BRPM | TEMPERATURE: 98 F | DIASTOLIC BLOOD PRESSURE: 84 MMHG | HEART RATE: 80 BPM | WEIGHT: 196 LBS | SYSTOLIC BLOOD PRESSURE: 135 MMHG | OXYGEN SATURATION: 95 % | HEIGHT: 65 IN

## 2022-12-12 DIAGNOSIS — K76.0 NAFLD (NONALCOHOLIC FATTY LIVER DISEASE): Primary | ICD-10-CM

## 2022-12-12 LAB
ALBUMIN SERPL-MCNC: 3.4 G/DL (ref 3.4–5)
ALP LIVER SERPL-CCNC: 94 U/L
ALT SERPL-CCNC: 28 U/L
AST SERPL-CCNC: 18 U/L (ref 15–37)
BILIRUB DIRECT SERPL-MCNC: 0.1 MG/DL (ref 0–0.2)
BILIRUB SERPL-MCNC: 0.6 MG/DL (ref 0.1–2)
PROT SERPL-MCNC: 7.2 G/DL (ref 6.4–8.2)

## 2022-12-12 PROCEDURE — 80076 HEPATIC FUNCTION PANEL: CPT | Performed by: INTERNAL MEDICINE

## 2022-12-12 PROCEDURE — 36415 COLL VENOUS BLD VENIPUNCTURE: CPT | Performed by: INTERNAL MEDICINE

## 2022-12-12 RX ORDER — TRIAMCINOLONE ACETONIDE 1 MG/G
0.1 CREAM TOPICAL AS DIRECTED
COMMUNITY
End: 2022-12-12

## 2022-12-12 RX ORDER — ALPRAZOLAM 0.25 MG/1
0.25 TABLET ORAL AS DIRECTED
COMMUNITY
End: 2022-12-12

## 2022-12-12 RX ORDER — ZOSTER VACCINE RECOMBINANT, ADJUVANTED 50 MCG/0.5
50 KIT INTRAMUSCULAR AS DIRECTED
COMMUNITY

## 2022-12-12 RX ORDER — MELOXICAM 15 MG/1
15 TABLET ORAL AS DIRECTED
COMMUNITY
End: 2022-12-12

## 2023-03-07 ENCOUNTER — OFFICE VISIT (OUTPATIENT)
Dept: INTERNAL MEDICINE CLINIC | Facility: CLINIC | Age: 78
End: 2023-03-07

## 2023-03-07 VITALS
DIASTOLIC BLOOD PRESSURE: 82 MMHG | SYSTOLIC BLOOD PRESSURE: 112 MMHG | BODY MASS INDEX: 32.1 KG/M2 | HEART RATE: 88 BPM | HEIGHT: 65 IN | WEIGHT: 192.63 LBS | RESPIRATION RATE: 18 BRPM | OXYGEN SATURATION: 96 %

## 2023-03-07 DIAGNOSIS — R73.03 PREDIABETES: ICD-10-CM

## 2023-03-07 DIAGNOSIS — Z12.11 COLON CANCER SCREENING: ICD-10-CM

## 2023-03-07 DIAGNOSIS — E55.9 VITAMIN D DEFICIENCY: ICD-10-CM

## 2023-03-07 DIAGNOSIS — R19.4 CHANGE IN BOWEL HABITS: Primary | ICD-10-CM

## 2023-03-07 PROCEDURE — 99214 OFFICE O/P EST MOD 30 MIN: CPT | Performed by: INTERNAL MEDICINE

## 2023-06-27 ENCOUNTER — MED REC SCAN ONLY (OUTPATIENT)
Dept: INTERNAL MEDICINE CLINIC | Facility: CLINIC | Age: 78
End: 2023-06-27

## 2023-07-20 ENCOUNTER — TELEPHONE (OUTPATIENT)
Dept: INTERNAL MEDICINE CLINIC | Facility: CLINIC | Age: 78
End: 2023-07-20

## 2023-07-20 NOTE — TELEPHONE ENCOUNTER
Patient was scheduled July 28th with Dr Sudhir Aviles. She was just rescheduled to Nov. 16th, which patient took. However, patient wants to know if Dr Compa Barros can get her appt moved up sooner, even with a different   GdI doc. Please call and advise pt at her cell or home phone numbers, okay to leave a voicemail.

## 2023-07-25 NOTE — TELEPHONE ENCOUNTER
Called patient, name/ verified. Offered sooner appt with another GI provider, pt declined d/t to location, stated she will keep her appt with Dr. Antonella Jiang in November. No further action required, TE closed.

## 2023-10-11 ENCOUNTER — MED REC SCAN ONLY (OUTPATIENT)
Dept: INTERNAL MEDICINE CLINIC | Facility: CLINIC | Age: 78
End: 2023-10-11

## 2023-11-02 ENCOUNTER — LAB ENCOUNTER (OUTPATIENT)
Dept: LAB | Facility: HOSPITAL | Age: 78
End: 2023-11-02
Attending: INTERNAL MEDICINE
Payer: MEDICARE

## 2023-11-02 ENCOUNTER — OFFICE VISIT (OUTPATIENT)
Facility: CLINIC | Age: 78
End: 2023-11-02
Payer: MEDICARE

## 2023-11-02 VITALS
HEIGHT: 65 IN | HEART RATE: 98 BPM | BODY MASS INDEX: 33.99 KG/M2 | WEIGHT: 204 LBS | DIASTOLIC BLOOD PRESSURE: 85 MMHG | SYSTOLIC BLOOD PRESSURE: 145 MMHG

## 2023-11-02 DIAGNOSIS — K52.9 CHRONIC DIARRHEA: Primary | ICD-10-CM

## 2023-11-02 DIAGNOSIS — R21 RASH: ICD-10-CM

## 2023-11-02 DIAGNOSIS — L50.8 OTHER URTICARIA: ICD-10-CM

## 2023-11-02 DIAGNOSIS — K76.0 NAFLD (NONALCOHOLIC FATTY LIVER DISEASE): ICD-10-CM

## 2023-11-02 DIAGNOSIS — K52.9 CHRONIC DIARRHEA: ICD-10-CM

## 2023-11-02 PROCEDURE — 1126F AMNT PAIN NOTED NONE PRSNT: CPT | Performed by: INTERNAL MEDICINE

## 2023-11-02 PROCEDURE — 99204 OFFICE O/P NEW MOD 45 MIN: CPT | Performed by: INTERNAL MEDICINE

## 2023-11-02 PROCEDURE — 36415 COLL VENOUS BLD VENIPUNCTURE: CPT

## 2023-11-02 PROCEDURE — 82785 ASSAY OF IGE: CPT

## 2023-11-02 PROCEDURE — 86003 ALLG SPEC IGE CRUDE XTRC EA: CPT

## 2023-11-02 NOTE — H&P
[facility] - Gastroenterology                                                                                                               Reason for consult: change in bowels, incontinence    Requesting physician or provider: Marci Samuel MD    Patient presents with:  Change of Bowel Habits: Fecal incompetent        HPI:   Lakesha Shannon is a 70year old year-old female with history of hx of uterine cancer, visual impairment, who presents for diarrhea/fecal incontinence.    -she has been having issues with diarrhea, and fecal incontinence  -always has to know where a bathroom is  -she went for pelvic floor PT to help this condition  -She says that she does have occasional solid stools but most the time they are loose and sometimes when she is out to dinner she has to run to the toilet, has had episodes of fecal incontinence  - She had her prior colonoscopies at [facility] and therefore is seeing [facility] for follow-up, unclear if they are aware of her diarrhea issues but I asked her to inform them about this  - She occasionally gets some rashes and she has facial rosacea, unclear if this is related to food allergies  - No melena or hematochezia  - No chest pain or shortness of breath  - Denies any family history of GI malignancy  - She is here with her  who is also worried about her diarrhea  - Oh weight loss issues, has had had some weight gain issues  - No dizziness  - No nocturnal stools    MRI liver (2018)  CONCLUSION:   1. Hepatomegaly with severe hepatic steatosis. 2. No focal hepatic lesions are identified. 3. Extensive bilateral lower lobe airspace disease is noted. This might represent atelectasis, but would be better assessed by chest CT if clinically warranted. 4. Lesser incidental findings as above.        Prior endoscopies:  Remote CLN- polyps    Soc:  -no smoking  -no Etoh    Wt Readings from Last 6 Encounters:  11/02/23 : 204 lb (92.5 kg)  03/07/23 : 192 lb 9.6 oz (87.4 kg)  12/12/22 : 196 lb (88.9 kg)  08/05/22 : 195 lb (88.5 kg)  05/05/22 : 192 lb (87.1 kg)  02/10/22 : 196 lb (88.9 kg)       History, Medications, Allergies, ROS:      Past Medical History:   Diagnosis Date    Cancer ([facility]     uriterine cancer    History of uterine cancer 1981    Visual impairment     wears glassses      Past Surgical History:   Procedure Laterality Date    ANESTH,RADICAL HYSTERECTOMY      HYSTERECTOMY      OTHER Right     knee meniscus repair    REMOVAL OF LUNG,LOBECTOMY  1990      Family Hx:   Family History   Problem Relation Age of Onset    Heart Disease Father     Heart Disease Brother       Social History:   Social History     Socioeconomic History    Marital status:    Tobacco Use    Smoking status: Never    Smokeless tobacco: Never   Vaping Use    Vaping Use: Never used   Substance and Sexual Activity    Alcohol use: Yes     Comment: socially    Drug use: No   Other Topics Concern    Caffeine Concern Yes     Comment: Chocolate    Right Handed Yes   Social History Narrative    Lives with     No children    Part-time teacher, 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 Medications (Active prior to today's visit):  Current Outpatient Medications   Medication Sig Dispense Refill    Ibuprofen (ADVIL OR) Take by mouth. Ergocalciferol (VITAMIN D OR) Take 5,000 Units by mouth.          Allergies:    Iodine (Topical)        HIVES  Radiology Contrast *    OTHER (SEE COMMENTS)    ROS:   CONSTITUTIONAL:  negative for fevers, rigors  EYES:  negative for diplopia   RESPIRATORY:  negative for severe shortness of breath  CARDIOVASCULAR:  negative for crushing sub-sternal chest pain  GASTROINTESTINAL:  see HPI  GENITOURINARY:  negative for dysuria or gross hematuria  INTEGUMENT/BREAST:  SKIN:  negative for jaundice   ALLERGIC/IMMUNOLOGIC:  negative for hay fever  ENDOCRINE:  negative for cold intolerance and heat intolerance  MUSCULOSKELETAL:  negative for joint effusion/severe erythema  BEHAVIOR/PSYCH:  negative for psychotic behavior      PHYSICAL EXAM:   Blood pressure 145/85, pulse 98, height 5' 5\" (1.651 m), weight 204 lb (92.5 kg), not currently breastfeeding. Gen- Patient appears comfortable and in no acute discomfort  HEENT: the sclera appears anicteric, oropharynx clear, mucus membranes appear moist  CV- regular rate and rhythm, the extremities are warm and well perfused   Lung- Moves air well; No labored breathing  Abdomen- soft, non-tender exam in all quadrants without rigidity or guarding, non-distended, no abnormal bowel sounds noted, no masses are palpated  Skin- No jaundice  Ext: no cyanosis, clubbing or edema is evident. Neuro- Alert and interactive, and gross movements of extremities normal  Psych - appropriate, non-agitated    Labs/Imaging:     Patient's pertinent labs and imaging were reviewed and discussed with patient today. ASSESSMENT/PLAN:   Lakesha Shannon is a 70year old year-old female with history of hx of uterine cancer, visual impairment, who presents for diarrhea/fecal incontinence. #Chronic diarrhea  #Fecal incontinence  #Rash  #NAFLD- LFTs remain normal. CTM    Her symptoms of diarrhea and incontinence, have been going on for several years. She appears to have already set up a colonoscopy at [facility] as she was getting colonoscopies there for polyp follow-up. I asked her to inform her doctor that she has been having chronic diarrhea and to obtain colon biopsies so microscopic colitis can be ruled out. In the meantime I will treat her for IBS with low FODMAP diet as well as low-dose Imodium to control some of her explosive diarrhea type symptoms. She agrees to plan. Rule out food allergies given history of rash.     Recommendations:    Blood test (food allergy)  Go for colonoscopy at [facility] which is already scheduled for November - make sure they obtain [address] to check for microscopic colitis  Start LOW Fodmap diet to help with diarrhea  Start HALF tab imodium every 2-3 days. Copy of this note sent to Dr. Malcolm Menchaca.     Orders This Visit:  Orders Placed This Encounter      Adult Food Allergy Prof      Meds This Visit:  Requested Prescriptions      No prescriptions requested or ordered in this encounter       Imaging & Referrals:  None         NOLAN Bowden MD  Pager: [phone]  11/2/2023        This note was partially prepared using [employer] voice recognition dictation software. As a result, errors may occur. When identified, these errors have been corrected.  While every attempt is made to correct errors during dictation, discrepancies may still exist.

## 2023-11-02 NOTE — PATIENT INSTRUCTIONS
Blood test (food allergy)  Go for colonoscopy at [facility] - make sure they obtain [address] to check for microscopic colitis  Start LOW Fodmap diet to help with diarrhea  Start HALF tab imodium every 2-3 days.

## 2023-11-03 ENCOUNTER — LAB ENCOUNTER (OUTPATIENT)
Dept: LAB | Age: 78
End: 2023-11-03
Attending: INTERNAL MEDICINE
Payer: MEDICARE

## 2023-11-03 DIAGNOSIS — R73.03 PREDIABETES: ICD-10-CM

## 2023-11-03 DIAGNOSIS — K76.0 NAFLD (NONALCOHOLIC FATTY LIVER DISEASE): ICD-10-CM

## 2023-11-03 LAB
ALBUMIN SERPL-MCNC: 4.1 G/DL (ref 3.2–4.8)
ALP LIVER SERPL-CCNC: 83 U/L
ALT SERPL-CCNC: 35 U/L
AST SERPL-CCNC: 27 U/L (ref ?–34)
BILIRUB DIRECT SERPL-MCNC: 0.2 MG/DL (ref ?–0.3)
BILIRUB SERPL-MCNC: 0.9 MG/DL (ref 0.2–1.1)
EST. AVERAGE GLUCOSE BLD GHB EST-MCNC: 120 MG/DL (ref 68–126)
HBA1C MFR BLD: 5.8 % (ref ?–5.7)
PROT SERPL-MCNC: 6.9 G/DL (ref 5.7–8.2)

## 2023-11-03 PROCEDURE — 36415 COLL VENOUS BLD VENIPUNCTURE: CPT

## 2023-11-03 PROCEDURE — 83036 HEMOGLOBIN GLYCOSYLATED A1C: CPT

## 2023-11-03 PROCEDURE — 80076 HEPATIC FUNCTION PANEL: CPT

## 2023-11-07 LAB
CLAM IGE QN: <0.1 KUA/L (ref ?–0.1)
CODFISH IGE QN: <0.1 KUA/L (ref ?–0.1)
CORN IGE QN: <0.1 KUA/L (ref ?–0.1)
COW MILK IGE QN: <0.1 KUA/L (ref ?–0.1)
EGG WHITE IGE QN: <0.1 KUA/L (ref ?–0.1)
IGE SERPL-ACNC: 16.4 KU/L (ref 2–214)
PEANUT IGE QN: <0.1 KUA/L (ref ?–0.1)
SCALLOP IGE QN: <0.1 KUA/L (ref ?–0.1)
SESAME SEED IGE QN: <0.1 KUA/L (ref ?–0.1)
SHRIMP IGE QN: <0.1 KUA/L (ref ?–0.1)
SOYBEAN IGE QN: <0.1 KUA/L (ref ?–0.1)
WALNUT IGE QN: <0.1 KUA/L (ref ?–0.1)
WHEAT IGE QN: <0.1 KUA/L (ref ?–0.1)

## 2023-11-21 ENCOUNTER — TELEPHONE (OUTPATIENT)
Facility: CLINIC | Age: 78
End: 2023-11-21

## 2023-12-01 ENCOUNTER — TELEPHONE (OUTPATIENT)
Dept: GASTROENTEROLOGY | Facility: CLINIC | Age: 78
End: 2023-12-01

## 2023-12-01 NOTE — TELEPHONE ENCOUNTER
PATH results reviewed:    -11/14//23  -[facility]  -random colon biopsies--->no colitis.  -colon polyp-->tubular adenoma and sessile serrated adenoma. NO dysplasia.

## 2023-12-06 ENCOUNTER — OFFICE VISIT (OUTPATIENT)
Dept: INTERNAL MEDICINE CLINIC | Facility: CLINIC | Age: 78
End: 2023-12-06
Payer: MEDICARE

## 2023-12-06 ENCOUNTER — LAB ENCOUNTER (OUTPATIENT)
Dept: LAB | Age: 78
End: 2023-12-06
Attending: INTERNAL MEDICINE
Payer: MEDICARE

## 2023-12-06 VITALS
OXYGEN SATURATION: 96 % | HEART RATE: 91 BPM | WEIGHT: 202 LBS | SYSTOLIC BLOOD PRESSURE: 127 MMHG | HEIGHT: 65 IN | BODY MASS INDEX: 33.66 KG/M2 | DIASTOLIC BLOOD PRESSURE: 84 MMHG

## 2023-12-06 DIAGNOSIS — Z00.00 ENCOUNTER FOR ANNUAL HEALTH EXAMINATION: ICD-10-CM

## 2023-12-06 DIAGNOSIS — E55.9 VITAMIN D DEFICIENCY: ICD-10-CM

## 2023-12-06 DIAGNOSIS — I70.0 ATHEROSCLEROSIS OF AORTA (HCC): ICD-10-CM

## 2023-12-06 DIAGNOSIS — K52.9 CHRONIC DIARRHEA: ICD-10-CM

## 2023-12-06 DIAGNOSIS — E78.00 PURE HYPERCHOLESTEROLEMIA: ICD-10-CM

## 2023-12-06 DIAGNOSIS — R73.03 PREDIABETES: ICD-10-CM

## 2023-12-06 DIAGNOSIS — R15.9 INCONTINENCE OF FECES WITH FECAL URGENCY: ICD-10-CM

## 2023-12-06 DIAGNOSIS — R15.2 INCONTINENCE OF FECES WITH FECAL URGENCY: ICD-10-CM

## 2023-12-06 DIAGNOSIS — Z00.00 ENCOUNTER FOR ANNUAL HEALTH EXAMINATION: Primary | ICD-10-CM

## 2023-12-06 DIAGNOSIS — K76.0 NAFLD (NONALCOHOLIC FATTY LIVER DISEASE): ICD-10-CM

## 2023-12-06 DIAGNOSIS — D69.2 SENILE PURPURA (HCC): ICD-10-CM

## 2023-12-06 DIAGNOSIS — E66.09 CLASS 1 OBESITY DUE TO EXCESS CALORIES WITHOUT SERIOUS COMORBIDITY WITH BODY MASS INDEX (BMI) OF 33.0 TO 33.9 IN ADULT: ICD-10-CM

## 2023-12-06 DIAGNOSIS — R16.0 LIVER MASS: ICD-10-CM

## 2023-12-06 PROBLEM — R21 RASH: Status: RESOLVED | Noted: 2023-11-02 | Resolved: 2023-12-06

## 2023-12-06 PROBLEM — L50.8 OTHER URTICARIA: Status: RESOLVED | Noted: 2023-11-02 | Resolved: 2023-12-06

## 2023-12-06 LAB
ALBUMIN SERPL-MCNC: 4.2 G/DL (ref 3.2–4.8)
ALBUMIN/GLOB SERPL: 1.4 {RATIO} (ref 1–2)
ALP LIVER SERPL-CCNC: 86 U/L
ALT SERPL-CCNC: 29 U/L
ANION GAP SERPL CALC-SCNC: 7 MMOL/L (ref 0–18)
AST SERPL-CCNC: 25 U/L (ref ?–34)
BILIRUB SERPL-MCNC: 0.6 MG/DL (ref 0.2–1.1)
BUN BLD-MCNC: 16 MG/DL (ref 9–23)
BUN/CREAT SERPL: 19.3 (ref 10–20)
CALCIUM BLD-MCNC: 9.5 MG/DL (ref 8.7–10.4)
CHLORIDE SERPL-SCNC: 106 MMOL/L (ref 98–112)
CO2 SERPL-SCNC: 27 MMOL/L (ref 21–32)
CREAT BLD-MCNC: 0.83 MG/DL
DEPRECATED RDW RBC AUTO: 44.1 FL (ref 35.1–46.3)
EGFRCR SERPLBLD CKD-EPI 2021: 73 ML/MIN/1.73M2 (ref 60–?)
ERYTHROCYTE [DISTWIDTH] IN BLOOD BY AUTOMATED COUNT: 12.4 % (ref 11–15)
FASTING STATUS PATIENT QL REPORTED: YES
GLOBULIN PLAS-MCNC: 2.9 G/DL (ref 2.8–4.4)
GLUCOSE BLD-MCNC: 95 MG/DL (ref 70–99)
HCT VFR BLD AUTO: 48.3 %
HGB BLD-MCNC: 16.5 G/DL
MCH RBC QN AUTO: 32.5 PG (ref 26–34)
MCHC RBC AUTO-ENTMCNC: 34.2 G/DL (ref 31–37)
MCV RBC AUTO: 95.3 FL
OSMOLALITY SERPL CALC.SUM OF ELEC: 291 MOSM/KG (ref 275–295)
PLATELET # BLD AUTO: 302 10(3)UL (ref 150–450)
POTASSIUM SERPL-SCNC: 4 MMOL/L (ref 3.5–5.1)
PROT SERPL-MCNC: 7.1 G/DL (ref 5.7–8.2)
RBC # BLD AUTO: 5.07 X10(6)UL
SODIUM SERPL-SCNC: 140 MMOL/L (ref 136–145)
TSI SER-ACNC: 2.28 MIU/ML (ref 0.55–4.78)
WBC # BLD AUTO: 6.4 X10(3) UL (ref 4–11)

## 2023-12-06 PROCEDURE — 80053 COMPREHEN METABOLIC PANEL: CPT

## 2023-12-06 PROCEDURE — 36415 COLL VENOUS BLD VENIPUNCTURE: CPT

## 2023-12-06 PROCEDURE — 84443 ASSAY THYROID STIM HORMONE: CPT

## 2023-12-06 PROCEDURE — 85027 COMPLETE CBC AUTOMATED: CPT

## 2023-12-11 ENCOUNTER — OFFICE VISIT (OUTPATIENT)
Dept: SURGERY | Facility: CLINIC | Age: 78
End: 2023-12-11

## 2023-12-11 VITALS
HEART RATE: 96 BPM | RESPIRATION RATE: 16 BRPM | WEIGHT: 202.38 LBS | BODY MASS INDEX: 34 KG/M2 | SYSTOLIC BLOOD PRESSURE: 131 MMHG | OXYGEN SATURATION: 95 % | TEMPERATURE: 97 F | DIASTOLIC BLOOD PRESSURE: 82 MMHG

## 2023-12-11 DIAGNOSIS — K76.0 FATTY LIVER: Primary | ICD-10-CM

## 2023-12-19 ENCOUNTER — HOSPITAL ENCOUNTER (OUTPATIENT)
Dept: ULTRASOUND IMAGING | Facility: HOSPITAL | Age: 78
Discharge: HOME | End: 2023-12-19
Attending: INTERNAL MEDICINE
Payer: MEDICARE

## 2023-12-19 DIAGNOSIS — K76.0 FATTY LIVER: ICD-10-CM

## 2023-12-19 PROCEDURE — 76705 ECHO EXAM OF ABDOMEN: CPT | Performed by: INTERNAL MEDICINE

## 2023-12-19 PROCEDURE — 76981 USE PARENCHYMA: CPT | Performed by: INTERNAL MEDICINE

## 2024-04-04 ENCOUNTER — OFFICE VISIT (OUTPATIENT)
Dept: INTERNAL MEDICINE CLINIC | Facility: CLINIC | Age: 79
End: 2024-04-04
Payer: MEDICARE

## 2024-04-04 VITALS
WEIGHT: 204 LBS | RESPIRATION RATE: 18 BRPM | HEART RATE: 92 BPM | SYSTOLIC BLOOD PRESSURE: 134 MMHG | OXYGEN SATURATION: 96 % | DIASTOLIC BLOOD PRESSURE: 72 MMHG | BODY MASS INDEX: 33.99 KG/M2 | HEIGHT: 65 IN

## 2024-04-04 DIAGNOSIS — M65.331 TRIGGER MIDDLE FINGER OF RIGHT HAND: Primary | ICD-10-CM

## 2024-04-04 DIAGNOSIS — E55.9 VITAMIN D DEFICIENCY: ICD-10-CM

## 2024-04-04 DIAGNOSIS — R73.03 PREDIABETES: ICD-10-CM

## 2024-04-04 DIAGNOSIS — E53.8 B12 DEFICIENCY: ICD-10-CM

## 2024-04-04 PROCEDURE — 99213 OFFICE O/P EST LOW 20 MIN: CPT | Performed by: INTERNAL MEDICINE

## 2024-04-04 NOTE — PROGRESS NOTES
Mary Leyva is a 78 year old female.  No chief complaint on file.      HPI:   Pt comes for f/u  C/c follow up  C/o right hand middle finger pain and occasionally gets stuck and this has been there for the past couple months at least  No falls trauma injury   Takes occ advil -not much help   She is right handed   She sees dr vences     has an apt with dr cárdenas coming up     Impression   CONCLUSION:  1. Hepatic steatosis versus diffuse hepatocellular disease.  2. F2 Metavir score:  Mild-moderate liver fibrosis staging , slight improvement (prior V median 1.8 m/s)  3. Cholelithiasis without gallbladder wall thickening.  Normal common duct.           HISTORY  A lot going on-  had some prostate issues and then needed hernia surg and found to have a blood clot in the lung and on eliquis for a fib as well   Sister passed away in may            HISTORY   New pt   C/c establish care - used to see dr mustafa   C/o noted bp is high today and she states she is trying to watch her salt   Takes a lot supplements   Saw gi for stool incont and went for PT and saw GI  at [facility]   Fatty liver-has seen Dr. zaidi - sees him once a yr - normal for age         PMH  htn   Prediabetes   Fatty liver   HL  Uterine cancer s/p surg chemo and RT -krissy rodrigues  (now retired ) at [facility]   Metastatic lesion of the uterine cancer to the lung status post lobectomy right side by Dr. Miller at the [facility]  Obese   H/o stool incont s/p PT   Vit d def -            Lives with  , retired - used to work as a       --------------------------------------------------------------------------      Current Outpatient Medications   Medication Sig Dispense Refill    Ibuprofen (ADVIL OR) Take by mouth.      Ergocalciferol (VITAMIN D OR) Take 5,000 Units by mouth.        Past Medical History:   Diagnosis Date    Allergic rhinitis spring time..if needed, I take Claritin    Arthritis knees    Cancer (HCC)      uriterine cancer    History of uterine cancer 1981    Visual impairment     wears glassses      Past Surgical History:   Procedure Laterality Date    Anesth,radical hysterectomy      Colonoscopy  11/14/2022    Hysterectomy      Other Right     knee meniscus repair    Removal of lung,lobectomy  1990      Social History:  Social History     Socioeconomic History    Marital status:    Tobacco Use    Smoking status: Former    Smokeless tobacco: Never    Tobacco comments:     never liked smoking   Vaping Use    Vaping Use: Never used   Substance and Sexual Activity    Alcohol use: Yes     Comment: glass of wine now and then    Drug use: Never   Other Topics Concern    Caffeine Concern Yes     Comment: Chocolate    Right Handed Yes   Social History Narrative    Lives with     No children    Part-time teacher, 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         REVIEW OF SYSTEMS:   GENERAL HEALTH: No fevers, chills, sweats, fatigue  VISION: No recent vision problems, blurry vision or double vision  RESPIRATORY: denies shortness of breath, cough, wheezing  CARDIOVASCULAR: denies chest pain on exertion, palpitations, swelling in feet  NEURO: denies headaches , anxiety, depression    EXAM:   /72 (BP Location: Right arm, Patient Position: Sitting, Cuff Size: large)   Pulse 92   Resp 18   Ht 5' 5\" (1.651 m)   Wt 204 lb (92.5 kg)   LMP  (LMP Unknown)   SpO2 96%   BMI 33.95 kg/m²   GENERAL: well developed, well nourished,in no apparent distress  SKIN: no rashes,no suspicious lesions  HEENT: atraumatic, normocephalic  NECK: supple,no adenopathy  LUNGS: clear to auscultation, no wheeze  CARDIO: RRR without murmur  EXTREMITIES: no edema    ASSESSMENT AND PLAN:   Diagnoses and all orders for this visit:    Trigger middle finger of right hand  -     Rheumatology Referral - In Network  Can look up trigger finger exercises, refer to rheum but she can try to see her ortho dr shanna [place] -dr Gray  [place]  Vitamin D deficiency  -     Vitamin D; Future  Recheck with next blood draw  B12 deficiency  -     Vitamin B12; Future  Recheck recheck, follow low-carb low sugar diet and exercise with a goal of 30 minutes a day  Prediabetes  -     Hemoglobin A1C; Future              Preventive medicine   mammogram December 2022  seen in care everywhere  dexa normal 6/2022 normal   Colonoscopy - bell , and then dr cárdenas 11/14/2023    Labs 11/2023 and 12/2023         The patient indicates understanding of these issues and agrees to the plan.  No follow-ups on file.

## 2024-04-08 ENCOUNTER — LAB ENCOUNTER (OUTPATIENT)
Dept: LAB | Age: 79
End: 2024-04-08
Attending: INTERNAL MEDICINE
Payer: MEDICARE

## 2024-04-08 DIAGNOSIS — E55.9 VITAMIN D DEFICIENCY: ICD-10-CM

## 2024-04-08 DIAGNOSIS — R73.03 PREDIABETES: ICD-10-CM

## 2024-04-08 DIAGNOSIS — E53.8 B12 DEFICIENCY: ICD-10-CM

## 2024-04-08 LAB
EST. AVERAGE GLUCOSE BLD GHB EST-MCNC: 120 MG/DL (ref 68–126)
HBA1C MFR BLD: 5.8 % (ref ?–5.7)
VIT B12 SERPL-MCNC: 285 PG/ML (ref 211–911)
VIT D+METAB SERPL-MCNC: 34.5 NG/ML (ref 30–100)

## 2024-04-08 PROCEDURE — 36415 COLL VENOUS BLD VENIPUNCTURE: CPT

## 2024-04-08 PROCEDURE — 83036 HEMOGLOBIN GLYCOSYLATED A1C: CPT

## 2024-04-08 PROCEDURE — 82306 VITAMIN D 25 HYDROXY: CPT

## 2024-04-08 PROCEDURE — 82607 VITAMIN B-12: CPT

## 2024-04-19 ENCOUNTER — OFFICE VISIT (OUTPATIENT)
Facility: CLINIC | Age: 79
End: 2024-04-19
Payer: MEDICARE

## 2024-04-19 VITALS
HEIGHT: 65 IN | DIASTOLIC BLOOD PRESSURE: 90 MMHG | HEART RATE: 99 BPM | SYSTOLIC BLOOD PRESSURE: 144 MMHG | BODY MASS INDEX: 34.29 KG/M2 | WEIGHT: 205.81 LBS

## 2024-04-19 DIAGNOSIS — K52.9 CHRONIC DIARRHEA: Primary | ICD-10-CM

## 2024-04-19 PROCEDURE — 99214 OFFICE O/P EST MOD 30 MIN: CPT | Performed by: INTERNAL MEDICINE

## 2024-04-19 RX ORDER — BUDESONIDE 3 MG/1
9 CAPSULE, COATED PELLETS ORAL EVERY MORNING
Qty: 90 CAPSULE | Refills: 0 | Status: SHIPPED | OUTPATIENT
Start: 2024-04-19 | End: 2024-05-19

## 2024-04-19 NOTE — PATIENT INSTRUCTIONS
Continue current imodium therapy  Stool tests - please make sure you get checked for giardia + elastase  Budesonide trial for 30 days, 9mg/day

## 2024-04-19 NOTE — PROGRESS NOTES
[facility] - Gastroenterology                                                                                                      Clinic Follow-up Visit    Chief Complaint   Patient presents with    Follow - Up         Subjective/HPI:   Mary Leyva is a 78 year old year-old female with history of hx of uterine cancer, visual impairment, who presents for fu diarrhea/fecal incontinence.       SINCE LAST VISIT:  -Diarrhea is better but ongoing, few times a day, sometimes solid stools, sometimes has urgency  - She is resorted to wearing depends  - She does use half tab of Imodium at times, but sometimes it causes constipation so she does not take regularly  - She wishes her bowels are normal  - No chest pain shortness of breath  - Food allergy panel negative  - Biopsies from colonoscopy in 2023 reviewed, shows no colitis or microscopic colitis  - No chest pain or shortness of breath  - No melena hematochezia  - No odynophagia or dysphagia  - No unusual weight loss  -Denies nocturnal diarrhea      INITIAL VISIT (2023)  -she has been having issues with diarrhea, and fecal incontinence  -always has to know where a bathroom is  -she went for pelvic floor PT to help this condition  -She says that she does have occasional solid stools but most the time they are loose and sometimes when she is out to dinner she has to run to the toilet, has had episodes of fecal incontinence  - She had her prior colonoscopies at [facility] and therefore is seeing [facility] for follow-up, unclear if they are aware of her diarrhea issues but I asked her to inform them about this  - She occasionally gets some rashes and she has facial rosacea, unclear if this is related to food allergies  - No melena or hematochezia  - No chest pain or shortness of breath  - Denies any family history of GI malignancy  - She is here with her  who is also worried about her  diarrhea  - Oh weight loss issues, has had had some weight gain issues  - No dizziness  - No nocturnal stools     MRI liver (2018)  CONCLUSION:   1. Hepatomegaly with severe hepatic steatosis.      2. No focal hepatic lesions are identified.      3. Extensive bilateral lower lobe airspace disease is noted. This might represent atelectasis, but would be better assessed by chest CT if clinically warranted.      4. Lesser incidental findings as above.         Prior endoscopies:  Remote CLN- polyps     Soc:  -no smoking  -no Etoh        Wt Readings from Last 6 Encounters:   24 205 lb 12.8 oz (93.4 kg)   24 204 lb (92.5 kg)   23 202 lb 6.4 oz (91.8 kg)   23 202 lb (91.6 kg)   23 204 lb (92.5 kg)   23 192 lb 9.6 oz (87.4 kg)        History, Medications, Allergies, ROS:      Past Medical History:    Allergic rhinitis    Arthritis    Cancer (HCC)    uriterine cancer    Hemorrhoids    History of uterine cancer    Visual impairment    wears glassses      Past Surgical History:   Procedure Laterality Date    Anesth,radical hysterectomy      Colonoscopy  2022    Hysterectomy      Other Right     knee meniscus repair    Removal of lung,lobectomy      Total abdom hysterectomy        Family History   Problem Relation Age of Onset    Heart Disease Father     Heart Disease Brother       Social History:   Social History     Socioeconomic History    Marital status:    Tobacco Use    Smoking status: Former     Current packs/day: 0.00     Average packs/day: 0.5 packs/day for 50.0 years (25.0 ttl pk-yrs)     Types: Cigarettes     Quit date: 1972     Years since quittin.3    Smokeless tobacco: Never    Tobacco comments:     never liked smoking   Vaping Use    Vaping status: Never Used   Substance and Sexual Activity    Alcohol use: Yes     Comment: glass of wine now and then    Drug use: Never   Other Topics Concern    Caffeine Concern Yes     Comment: Chocolate    Right Handed  Yes   Social History Narrative    Lives with     No children    Part-time teacher, 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      Social Determinants of Health      Received from [facility], [facility]    Social Connections    Received from [facility], [facility]    Housing Stability        Medications (Active prior to today's visit):  Current Outpatient Medications   Medication Sig Dispense Refill    budesonide 3 MG Oral Cap DR Particles Take 3 capsules (9 mg total) by mouth every morning. 90 capsule 0    Ibuprofen (ADVIL OR) Take by mouth.      Ergocalciferol (VITAMIN D OR) Take 5,000 Units by mouth.         Allergies:  Allergies   Allergen Reactions    Iodine (Topical) HIVES    Radiology Contrast Iodinated Dyes OTHER (SEE COMMENTS)       ROS:   CONSTITUTIONAL:  negative for fevers, chills  EYES:  negative for change in vision  RESPIRATORY:  negative for  shortness of breath  CARDIOVASCULAR:  negative for  chest pain  GASTROINTESTINAL:  see HPI  GENITOURINARY:  negative for dysuria  INTEGUMENT/BREAST:  SKIN:  negative for  rash  ALLERGIC/IMMUNOLOGIC:  negative for hay fever  ENDOCRINE:  negative for cold intolerance and heat intolerance  MUSCULOSKELETAL:  negative for  joint stiffness and joint swelling  BEHAVIOR/PSYCH:  negative for depressed mood    PHYSICAL EXAM:   Blood pressure 144/90, pulse 99, height 5' 5\" (1.651 m), weight 205 lb 12.8 oz (93.4 kg), not currently breastfeeding.    Gen- Patient appears comfortable and in no acute discomfort  HEENT: the sclera appears anicteric, oropharynx clear, mucus membranes appear moist  CV- regular rate and rhythm, the extremities are warm and well perfused   Lung- Moves air well; No labored breathing  Abdomen- soft, non-tender exam in all quadrants without rigidity or guarding, non-distended, no abnormal bowel sounds noted, no masses are palpated  Skin- No jaundice  Ext: no  cyanosis, clubbing or edema is evident.   Neuro- Alert and oriented x4, and patient is having movement of all 4 extremities   Psych - appropriate, non-agitated    Labs/Imaging:     Patient's labs and imaging were reviewed and discussed with patient today.     .  ASSESSMENT/PLAN:   Mary Leyva is a 78 year old year-old female with history of hx of uterine cancer, visual impairment, who presents for fu diarrhea/fecal incontinence.     #Chronic diarrhea  #Fecal incontinence  #Rash - improved, allergic rx  #NAFLD- LFTs remain normal. CTM  #Cholelithiasis - asx, d/w patient if ab pain, notify me     >> Ongoing issues of diarrhea and incontinence, colonoscopy at [facility] reviewed.  No colitis evident on colonoscopy or biopsies.  However I discussed with patient there are issues with sampling error and she still may have microscopic colitis that has not been treated.  We discussed strategy of empiric treatment with budesonide to see if this improves her symptoms.  Risks and benefits of this approach discussed.  She agrees to trial of budesonide for 30 days.  If no improvement would focus on treatment with IBS, potentially Pamelor medication.       Continue current imodium therapy  Stool tests - please make sure you get checked for giardia + elastase  Budesonide trial for 30 days, 9mg/day - let me know in 1 month how you are doing        Orders This Visit:  Orders Placed This Encounter   Procedures    Pancreatic Elastase, Fecal    Giardia + Crypto Antigen, Stool       Meds This Visit:  Requested Prescriptions     Signed Prescriptions Disp Refills    budesonide 3 MG Oral Cap DR Particles 90 capsule 0     Sig: Take 3 capsules (9 mg total) by mouth every morning.       Imaging & Referrals:  None     4/19/2024    NOLAN Stern MD  Pager: [phone]        This note was partially prepared using Dragon Medical voice recognition dictation software. As a result, errors may occur. When identified, these errors have been corrected.  While every attempt is made to correct errors during dictation, discrepancies may still exist.

## 2024-04-24 ENCOUNTER — LAB ENCOUNTER (OUTPATIENT)
Dept: LAB | Facility: HOSPITAL | Age: 79
End: 2024-04-24
Attending: INTERNAL MEDICINE
Payer: MEDICARE

## 2024-04-24 DIAGNOSIS — K52.9 CHRONIC DIARRHEA: ICD-10-CM

## 2024-04-24 LAB
CRYPTOSP AG STL QL IA: NEGATIVE
G LAMBLIA AG STL QL IA: NEGATIVE

## 2024-04-24 PROCEDURE — 82656 EL-1 FECAL QUAL/SEMIQ: CPT | Performed by: INTERNAL MEDICINE

## 2024-04-24 PROCEDURE — 87272 CRYPTOSPORIDIUM AG IF: CPT

## 2024-04-24 PROCEDURE — 87329 GIARDIA AG IA: CPT

## 2024-04-25 ENCOUNTER — TELEPHONE (OUTPATIENT)
Facility: CLINIC | Age: 79
End: 2024-04-25

## 2024-04-26 NOTE — TELEPHONE ENCOUNTER
Dr. Stern,    Denial received for Budesonide. Patient needs FDA approved diagnosis for the medicaiton. We can send in an appeal. Please advise, thank you.

## 2024-04-29 RX ORDER — PREDNISONE 10 MG/1
10 TABLET ORAL DAILY
Qty: 63 TABLET | Refills: 0 | Status: SHIPPED | OUTPATIENT
Start: 2024-04-29 | End: 2024-04-29

## 2024-04-29 RX ORDER — PREDNISONE 10 MG/1
TABLET ORAL
Qty: 70 TABLET | Refills: 0 | Status: SHIPPED | OUTPATIENT
Start: 2024-04-29 | End: 2024-05-26

## 2024-04-29 NOTE — TELEPHONE ENCOUNTER
D/w patient budesonide not covered, will trial prednisone for 4 weeks  taper to see if diarrhea improves. She understands risks/benefits of this approach. She delines colonoscopy.

## 2024-04-30 ENCOUNTER — TELEPHONE (OUTPATIENT)
Facility: CLINIC | Age: 79
End: 2024-04-30

## 2024-04-30 LAB — PANCREATIC ELAST FECAL: 136 UG ELAST./G

## 2024-04-30 NOTE — TELEPHONE ENCOUNTER
Patient states that she is aware insurance will not cover Budesonide.  She states that it is only $65 for the month, so she will pick it up today

## 2024-05-02 ENCOUNTER — TELEPHONE (OUTPATIENT)
Facility: CLINIC | Age: 79
End: 2024-05-02

## 2024-05-02 DIAGNOSIS — K86.1 CHRONIC PANCREATITIS, UNSPECIFIED PANCREATITIS TYPE (HCC): Primary | ICD-10-CM

## 2024-05-02 DIAGNOSIS — K52.9 CHRONIC DIARRHEA: ICD-10-CM

## 2024-05-02 RX ORDER — PREDNISONE 50 MG/1
TABLET ORAL
Qty: 3 TABLET | Refills: 0 | Status: SHIPPED | OUTPATIENT
Start: 2024-05-02

## 2024-05-02 NOTE — TELEPHONE ENCOUNTER
Mychart sent.   Erivedge Pregnancy And Lactation Text: This medication is Pregnancy Category X and is absolutely contraindicated during pregnancy. It is unknown if it is excreted in breast milk. Rifampin Counseling: I discussed with the patient the risks of rifampin including but not limited to liver damage, kidney damage, red-orange body fluids, nausea/vomiting and severe allergy. Oxybutynin Pregnancy And Lactation Text: This medication is Pregnancy Category B and is considered safe during pregnancy. It is unknown if it is excreted in breast milk. Include Pregnancy/Lactation Warning?: No Minoxidil Pregnancy And Lactation Text: This medication has not been assigned a Pregnancy Risk Category but animal studies failed to show danger with the topical medication. It is unknown if the medication is excreted in breast milk. Thalidomide Counseling: I discussed with the patient the risks of thalidomide including but not limited to birth defects, anxiety, weakness, chest pain, dizziness, cough and severe allergy. Bexarotene Pregnancy And Lactation Text: This medication is Pregnancy Category X and should not be given to women who are pregnant or may become pregnant. This medication should not be used if you are breast feeding. Bactrim Counseling:  I discussed with the patient the risks of sulfa antibiotics including but not limited to GI upset, allergic reaction, drug rash, diarrhea, dizziness, photosensitivity, and yeast infections.  Rarely, more serious reactions can occur including but not limited to aplastic anemia, agranulocytosis, methemoglobinemia, blood dyscrasias, liver or kidney failure, lung infiltrates or desquamative/blistering drug rashes. Gabapentin Pregnancy And Lactation Text: This medication is Pregnancy Category C and isn't considered safe during pregnancy. It is excreted in breast milk. Clindamycin Counseling: I counseled the patient regarding use of clindamycin as an antibiotic for prophylactic and/or therapeutic purposes. Clindamycin is active against numerous classes of bacteria, including skin bacteria. Side effects may include nausea, diarrhea, gastrointestinal upset, rash, hives, yeast infections, and in rare cases, colitis. Valtrex Counseling: I discussed with the patient the risks of valacyclovir including but not limited to kidney damage, nausea, vomiting and severe allergy.  The patient understands that if the infection seems to be worsening or is not improving, they are to call. Acitretin Counseling:  I discussed with the patient the risks of acitretin including but not limited to hair loss, dry lips/skin/eyes, liver damage, hyperlipidemia, depression/suicidal ideation, photosensitivity.  Serious rare side effects can include but are not limited to pancreatitis, pseudotumor cerebri, bony changes, clot formation/stroke/heart attack.  Patient understands that alcohol is contraindicated since it can result in liver toxicity and significantly prolong the elimination of the drug by many years. Topical Retinoid counseling:  Patient advised to apply a pea-sized amount only at bedtime and wait 30 minutes after washing their face before applying.  If too drying, patient may add a non-comedogenic moisturizer. The patient verbalized understanding of the proper use and possible adverse effects of retinoids.  All of the patient's questions and concerns were addressed. Ilumya Counseling: I discussed with the patient the risks of tildrakizumab including but not limited to immunosuppression, malignancy, posterior leukoencephalopathy syndrome, and serious infections.  The patient understands that monitoring is required including a PPD at baseline and must alert us or the primary physician if symptoms of infection or other concerning signs are noted. Simponi Pregnancy And Lactation Text: The risk during pregnancy and breastfeeding is uncertain with this medication. Picato Counseling:  I discussed with the patient the risks of Picato including but not limited to erythema, scaling, itching, weeping, crusting, and pain. Oxybutynin Counseling:  I discussed with the patient the risks of oxybutynin including but not limited to skin rash, drowsiness, dry mouth, difficulty urinating, and blurred vision. Carac Pregnancy And Lactation Text: This medication is Pregnancy Category X and contraindicated in pregnancy and in women who may become pregnant. It is unknown if this medication is excreted in breast milk. Spironolactone Counseling: Patient advised regarding risks of diarrhea, abdominal pain, hyperkalemia, birth defects (for female patients), liver toxicity and renal toxicity. The patient may need blood work to monitor liver and kidney function and potassium levels while on therapy. The patient verbalized understanding of the proper use and possible adverse effects of spironolactone.  All of the patient's questions and concerns were addressed. Rifampin Pregnancy And Lactation Text: This medication is Pregnancy Category C and it isn't know if it is safe during pregnancy. It is also excreted in breast milk and should not be used if you are breast feeding. Skyrizi Counseling: I discussed with the patient the risks of risankizumab-rzaa including but not limited to immunosuppression, and serious infections.  The patient understands that monitoring is required including a PPD at baseline and must alert us or the primary physician if symptoms of infection or other concerning signs are noted. Hydroxychloroquine Counseling:  I discussed with the patient that a baseline ophthalmologic exam is needed at the start of therapy and every year thereafter while on therapy. A CBC may also be warranted for monitoring.  The side effects of this medication were discussed with the patient, including but not limited to agranulocytosis, aplastic anemia, seizures, rashes, retinopathy, and liver toxicity. Patient instructed to call the office should any adverse effect occur.  The patient verbalized understanding of the proper use and possible adverse effects of Plaquenil.  All the patient's questions and concerns were addressed. Terbinafine Counseling: Patient counseling regarding adverse effects of terbinafine including but not limited to headache, diarrhea, rash, upset stomach, liver function test abnormalities, itching, taste/smell disturbance, nausea, abdominal pain, and flatulence.  There is a rare possibility of liver failure that can occur when taking terbinafine.  The patient understands that a baseline LFT and kidney function test may be required. The patient verbalized understanding of the proper use and possible adverse effects of terbinafine.  All of the patient's questions and concerns were addressed. Clofazimine Counseling:  I discussed with the patient the risks of clofazimine including but not limited to skin and eye pigmentation, liver damage, nausea/vomiting, gastrointestinal bleeding and allergy. Tetracycline Pregnancy And Lactation Text: This medication is Pregnancy Category D and not consider safe during pregnancy. It is also excreted in breast milk. Otezla Counseling: The side effects of Otezla were discussed with the patient, including but not limited to worsening or new depression, weight loss, diarrhea, nausea, upper respiratory tract infection, and headache. Patient instructed to call the office should any adverse effect occur.  The patient verbalized understanding of the proper use and possible adverse effects of Otezla.  All the patient's questions and concerns were addressed. Cimetidine Pregnancy And Lactation Text: This medication is Pregnancy Category B and is considered safe during pregnancy. It is also excreted in breast milk and breast feeding isn't recommended. Albendazole Pregnancy And Lactation Text: This medication is Pregnancy Category C and it isn't known if it is safe during pregnancy. It is also excreted in breast milk. SSKI Counseling:  I discussed with the patient the risks of SSKI including but not limited to thyroid abnormalities, metallic taste, GI upset, fever, headache, acne, arthralgias, paraesthesias, lymphadenopathy, easy bleeding, arrhythmias, and allergic reaction. Tetracycline Counseling: Patient counseled regarding possible photosensitivity and increased risk for sunburn.  Patient instructed to avoid sunlight, if possible.  When exposed to sunlight, patients should wear protective clothing, sunglasses, and sunscreen.  The patient was instructed to call the office immediately if the following severe adverse effects occur:  hearing changes, easy bruising/bleeding, severe headache, or vision changes.  The patient verbalized understanding of the proper use and possible adverse effects of tetracycline.  All of the patient's questions and concerns were addressed. Patient understands to avoid pregnancy while on therapy due to potential birth defects. Itraconazole Counseling:  I discussed with the patient the risks of itraconazole including but not limited to liver damage, nausea/vomiting, neuropathy, and severe allergy.  The patient understands that this medication is best absorbed when taken with acidic beverages such as non-diet cola or ginger ale.  The patient understands that monitoring is required including baseline LFTs and repeat LFTs at intervals.  The patient understands that they are to contact us or the primary physician if concerning signs are noted. 5-Fu Counseling: 5-Fluorouracil Counseling:  I discussed with the patient the risks of 5-fluorouracil including but not limited to erythema, scaling, itching, weeping, crusting, and pain. Cimetidine Counseling:  I discussed with the patient the risks of Cimetidine including but not limited to gynecomastia, headache, diarrhea, nausea, drowsiness, arrhythmias, pancreatitis, skin rashes, psychosis, bone marrow suppression and kidney toxicity. Fluconazole Counseling:  Patient counseled regarding adverse effects of fluconazole including but not limited to headache, diarrhea, nausea, upset stomach, liver function test abnormalities, taste disturbance, and stomach pain.  There is a rare possibility of liver failure that can occur when taking fluconazole.  The patient understands that monitoring of LFTs and kidney function test may be required, especially at baseline. The patient verbalized understanding of the proper use and possible adverse effects of fluconazole.  All of the patient's questions and concerns were addressed. Humira Pregnancy And Lactation Text: This medication is Pregnancy Category B and is considered safe during pregnancy. It is unknown if this medication is excreted in breast milk. Hydroxychloroquine Pregnancy And Lactation Text: This medication has been shown to cause fetal harm but it isn't assigned a Pregnancy Risk Category. There are small amounts excreted in breast milk. Cellcept Counseling:  I discussed with the patient the risks of mycophenolate mofetil including but not limited to infection/immunosuppression, GI upset, hypokalemia, hypercholesterolemia, bone marrow suppression, lymphoproliferative disorders, malignancy, GI ulceration/bleed/perforation, colitis, interstitial lung disease, kidney failure, progressive multifocal leukoencephalopathy, and birth defects.  The patient understands that monitoring is required including a baseline creatinine and regular CBC testing. In addition, patient must alert us immediately if symptoms of infection or other concerning signs are noted. Xelminaz Pregnancy And Lactation Text: This medication is Pregnancy Category D and is not considered safe during pregnancy.  The risk during breast feeding is also uncertain. Dapsone Counseling: I discussed with the patient the risks of dapsone including but not limited to hemolytic anemia, agranulocytosis, rashes, methemoglobinemia, kidney failure, peripheral neuropathy, headaches, GI upset, and liver toxicity.  Patients who start dapsone require monitoring including baseline LFTs and weekly CBCs for the first month, then every month thereafter.  The patient verbalized understanding of the proper use and possible adverse effects of dapsone.  All of the patient's questions and concerns were addressed. Prednisone Pregnancy And Lactation Text: This medication is Pregnancy Category C and it isn't know if it is safe during pregnancy. This medication is excreted in breast milk. Erivedge Counseling- I discussed with the patient the risks of Erivedge including but not limited to nausea, vomiting, diarrhea, constipation, weight loss, changes in the sense of taste, decreased appetite, muscle spasms, and hair loss.  The patient verbalized understanding of the proper use and possible adverse effects of Erivedge.  All of the patient's questions and concerns were addressed. Elidel Pregnancy And Lactation Text: This medication is Pregnancy Category C. It is unknown if this medication is excreted in breast milk. Tremfya Counseling: I discussed with the patient the risks of guselkumab including but not limited to immunosuppression, serious infections, and drug reactions.  The patient understands that monitoring is required including a PPD at baseline and must alert us or the primary physician if symptoms of infection or other concerning signs are noted. Drysol Pregnancy And Lactation Text: This medication is considered safe during pregnancy and breast feeding. Colchicine Counseling:  Patient counseled regarding adverse effects including but not limited to stomach upset (nausea, vomiting, stomach pain, or diarrhea).  Patient instructed to limit alcohol consumption while taking this medication.  Colchicine may reduce blood counts especially with prolonged use.  The patient understands that monitoring of kidney function and blood counts may be required, especially at baseline. The patient verbalized understanding of the proper use and possible adverse effects of colchicine.  All of the patient's questions and concerns were addressed. Dapsone Pregnancy And Lactation Text: This medication is Pregnancy Category C and is not considered safe during pregnancy or breast feeding. Clindamycin Pregnancy And Lactation Text: This medication can be used in pregnancy if certain situations. Clindamycin is also present in breast milk. Solaraze Counseling:  I discussed with the patient the risks of Solaraze including but not limited to erythema, scaling, itching, weeping, crusting, and pain. Doxycycline Pregnancy And Lactation Text: This medication is Pregnancy Category D and not consider safe during pregnancy. It is also excreted in breast milk but is considered safe for shorter treatment courses. Albendazole Counseling:  I discussed with the patient the risks of albendazole including but not limited to cytopenia, kidney damage, nausea/vomiting and severe allergy.  The patient understands that this medication is being used in an off-label manner. Griseofulvin Pregnancy And Lactation Text: This medication is Pregnancy Category X and is known to cause serious birth defects. It is unknown if this medication is excreted in breast milk but breast feeding should be avoided. Taltz Counseling: I discussed with the patient the risks of ixekizumab including but not limited to immunosuppression, serious infections, worsening of inflammatory bowel disease and drug reactions.  The patient understands that monitoring is required including a PPD at baseline and must alert us or the primary physician if symptoms of infection or other concerning signs are noted. Cephalexin Pregnancy And Lactation Text: This medication is Pregnancy Category B and considered safe during pregnancy.  It is also excreted in breast milk but can be used safely for shorter doses. Dupixent Counseling: I discussed with the patient the risks of dupilumab including but not limited to eye infection and irritation, cold sores, injection site reactions, worsening of asthma, allergic reactions and increased risk of parasitic infection.  Live vaccines should be avoided while taking dupilumab. Dupilumab will also interact with certain medications such as warfarin and cyclosporine. The patient understands that monitoring is required and they must alert us or the primary physician if symptoms of infection or other concerning signs are noted. Nsaids Counseling: NSAID Counseling: I discussed with the patient that NSAIDs should be taken with food. Prolonged use of NSAIDs can result in the development of stomach ulcers.  Patient advised to stop taking NSAIDs if abdominal pain occurs.  The patient verbalized understanding of the proper use and possible adverse effects of NSAIDs.  All of the patient's questions and concerns were addressed. Metronidazole Counseling:  I discussed with the patient the risks of metronidazole including but not limited to seizures, nausea/vomiting, a metallic taste in the mouth, nausea/vomiting and severe allergy. Rituxan Counseling:  I discussed with the patient the risks of Rituxan infusions. Side effects can include infusion reactions, severe drug rashes including mucocutaneous reactions, reactivation of latent hepatitis and other infections and rarely progressive multifocal leukoencephalopathy.  All of the patient's questions and concerns were addressed. Xolair Counseling:  Patient informed of potential adverse effects including but not limited to fever, muscle aches, rash and allergic reactions.  The patient verbalized understanding of the proper use and possible adverse effects of Xolair.  All of the patient's questions and concerns were addressed. Arava Counseling:  Patient counseled regarding adverse effects of Arava including but not limited to nausea, vomiting, abnormalities in liver function tests. Patients may develop mouth sores, rash, diarrhea, and abnormalities in blood counts. The patient understands that monitoring is required including LFTs and blood counts.  There is a rare possibility of scarring of the liver and lung problems that can occur when taking methotrexate. Persistent nausea, loss of appetite, pale stools, dark urine, cough, and shortness of breath should be reported immediately. Patient advised to discontinue Arava treatment and consult with a physician prior to attempting conception. The patient will have to undergo a treatment to eliminate Arava from the body prior to conception. Cellcept Pregnancy And Lactation Text: This medication is Pregnancy Category D and isn't considered safe during pregnancy. It is unknown if this medication is excreted in breast milk. Bactrim Pregnancy And Lactation Text: This medication is Pregnancy Category D and is known to cause fetal risk.  It is also excreted in breast milk. Zyclara Counseling:  I discussed with the patient the risks of imiquimod including but not limited to erythema, scaling, itching, weeping, crusting, and pain.  Patient understands that the inflammatory response to imiquimod is variable from person to person and was educated regarded proper titration schedule.  If flu-like symptoms develop, patient knows to discontinue the medication and contact us. Tazorac Pregnancy And Lactation Text: This medication is not safe during pregnancy. It is unknown if this medication is excreted in breast milk. Sski Pregnancy And Lactation Text: This medication is Pregnancy Category D and isn't considered safe during pregnancy. It is excreted in breast milk. Itraconazole Pregnancy And Lactation Text: This medication is Pregnancy Category C and it isn't know if it is safe during pregnancy. It is also excreted in breast milk. Cyclophosphamide Counseling:  I discussed with the patient the risks of cyclophosphamide including but not limited to hair loss, hormonal abnormalities, decreased fertility, abdominal pain, diarrhea, nausea and vomiting, bone marrow suppression and infection. The patient understands that monitoring is required while taking this medication. Topical Sulfur Applications Pregnancy And Lactation Text: This medication is Pregnancy Category C and has an unknown safety profile during pregnancy. It is unknown if this topical medication is excreted in breast milk. Drysol Counseling:  I discussed with the patient the risks of drysol/aluminum chloride including but not limited to skin rash, itching, irritation, burning. Tazorac Counseling:  Patient advised that medication is irritating and drying.  Patient may need to apply sparingly and wash off after an hour before eventually leaving it on overnight.  The patient verbalized understanding of the proper use and possible adverse effects of tazorac.  All of the patient's questions and concerns were addressed. Xolair Pregnancy And Lactation Text: This medication is Pregnancy Category B and is considered safe during pregnancy. This medication is excreted in breast milk. Protopic Pregnancy And Lactation Text: This medication is Pregnancy Category C. It is unknown if this medication is excreted in breast milk when applied topically. Methotrexate Counseling:  Patient counseled regarding adverse effects of methotrexate including but not limited to nausea, vomiting, abnormalities in liver function tests. Patients may develop mouth sores, rash, diarrhea, and abnormalities in blood counts. The patient understands that monitoring is required including LFT's and blood counts.  There is a rare possibility of scarring of the liver and lung problems that can occur when taking methotrexate. Persistent nausea, loss of appetite, pale stools, dark urine, cough, and shortness of breath should be reported immediately. Patient advised to discontinue methotrexate treatment at least three months before attempting to become pregnant.  I discussed the need for folate supplements while taking methotrexate.  These supplements can decrease side effects during methotrexate treatment. The patient verbalized understanding of the proper use and possible adverse effects of methotrexate.  All of the patient's questions and concerns were addressed. Erythromycin Counseling:  I discussed with the patient the risks of erythromycin including but not limited to GI upset, allergic reaction, drug rash, diarrhea, increase in liver enzymes, and yeast infections. Bexarotene Counseling:  I discussed with the patient the risks of bexarotene including but not limited to hair loss, dry lips/skin/eyes, liver abnormalities, hyperlipidemia, pancreatitis, depression/suicidal ideation, photosensitivity, drug rash/allergic reactions, hypothyroidism, anemia, leukopenia, infection, cataracts, and teratogenicity.  Patient understands that they will need regular blood tests to check lipid profile, liver function tests, white blood cell count, thyroid function tests and pregnancy test if applicable. Elidel Counseling: Patient may experience a mild burning sensation during topical application. Elidel is not approved in children less than 2 years of age. There have been case reports of hematologic and skin malignancies in patients using topical calcineurin inhibitors although causality is questionable. Griseofulvin Counseling:  I discussed with the patient the risks of griseofulvin including but not limited to photosensitivity, cytopenia, liver damage, nausea/vomiting and severe allergy.  The patient understands that this medication is best absorbed when taken with a fatty meal (e.g., ice cream or french fries). Spironolactone Pregnancy And Lactation Text: This medication can cause feminization of the male fetus and should be avoided during pregnancy. The active metabolite is also found in breast milk. Nsaids Pregnancy And Lactation Text: These medications are considered safe up to 30 weeks gestation. It is excreted in breast milk. Cephalexin Counseling: I counseled the patient regarding use of cephalexin as an antibiotic for prophylactic and/or therapeutic purposes. Cephalexin (commonly prescribed under brand name Keflex) is a cephalosporin antibiotic which is active against numerous classes of bacteria, including most skin bacteria. Side effects may include nausea, diarrhea, gastrointestinal upset, rash, hives, yeast infections, and in rare cases, hepatitis, kidney disease, seizures, fever, confusion, neurologic symptoms, and others. Patients with severe allergies to penicillin medications are cautioned that there is about a 10% incidence of cross-reactivity with cephalosporins. When possible, patients with penicillin allergies should use alternatives to cephalosporins for antibiotic therapy. Carac Counseling:  I discussed with the patient the risks of Carac including but not limited to erythema, scaling, itching, weeping, crusting, and pain. Ketoconazole Counseling:   Patient counseled regarding improving absorption with orange juice.  Adverse effects include but are not limited to breast enlargement, headache, diarrhea, nausea, upset stomach, liver function test abnormalities, taste disturbance, and stomach pain.  There is a rare possibility of liver failure that can occur when taking ketoconazole. The patient understands that monitoring of LFTs may be required, especially at baseline. The patient verbalized understanding of the proper use and possible adverse effects of ketoconazole.  All of the patient's questions and concerns were addressed. Hydroquinone Counseling:  Patient advised that medication may result in skin irritation, lightening (hypopigmentation), dryness, and burning.  In the event of skin irritation, the patient was advised to reduce the amount of the drug applied or use it less frequently.  Rarely, spots that are treated with hydroquinone can become darker (pseudoochronosis).  Should this occur, patient instructed to stop medication and call the office. The patient verbalized understanding of the proper use and possible adverse effects of hydroquinone.  All of the patient's questions and concerns were addressed. Isotretinoin Pregnancy And Lactation Text: This medication is Pregnancy Category X and is considered extremely dangerous during pregnancy. It is unknown if it is excreted in breast milk. High Dose Vitamin A Pregnancy And Lactation Text: High dose vitamin A therapy is contraindicated during pregnancy and breast feeding. Minoxidil Counseling: Minoxidil is a topical medication which can increase blood flow where it is applied. It is uncertain how this medication increases hair growth. Side effects are uncommon and include stinging and allergic reactions. Cimzia Pregnancy And Lactation Text: This medication crosses the placenta but can be considered safe in certain situations. Cimzia may be excreted in breast milk. Glycopyrrolate Pregnancy And Lactation Text: This medication is Pregnancy Category B and is considered safe during pregnancy. It is unknown if it is excreted breast milk. Birth Control Pills Counseling: Birth Control Pill Counseling: I discussed with the patient the potential side effects of OCPs including but not limited to increased risk of stroke, heart attack, thrombophlebitis, deep venous thrombosis, hepatic adenomas, breast changes, GI upset, headaches, and depression.  The patient verbalized understanding of the proper use and possible adverse effects of OCPs. All of the patient's questions and concerns were addressed. High Dose Vitamin A Counseling: Side effects reviewed, pt to contact office should one occur. Enbrel Counseling:  I discussed with the patient the risks of etanercept including but not limited to myelosuppression, immunosuppression, autoimmune hepatitis, demyelinating diseases, lymphoma, and infections.  The patient understands that monitoring is required including a PPD at baseline and must alert us or the primary physician if symptoms of infection or other concerning signs are noted. Solaraze Pregnancy And Lactation Text: This medication is Pregnancy Category B and is considered safe. There is some data to suggest avoiding during the third trimester. It is unknown if this medication is excreted in breast milk. Odomzo Counseling- I discussed with the patient the risks of Odomzo including but not limited to nausea, vomiting, diarrhea, constipation, weight loss, changes in the sense of taste, decreased appetite, muscle spasms, and hair loss.  The patient verbalized understanding of the proper use and possible adverse effects of Odomzo.  All of the patient's questions and concerns were addressed. Doxepin Pregnancy And Lactation Text: This medication is Pregnancy Category C and it isn't known if it is safe during pregnancy. It is also excreted in breast milk and breast feeding isn't recommended. Azathioprine Counseling:  I discussed with the patient the risks of azathioprine including but not limited to myelosuppression, immunosuppression, hepatotoxicity, lymphoma, and infections.  The patient understands that monitoring is required including baseline LFTs, Creatinine, possible TPMP genotyping and weekly CBCs for the first month and then every 2 weeks thereafter.  The patient verbalized understanding of the proper use and possible adverse effects of azathioprine.  All of the patient's questions and concerns were addressed. Cosentyx Counseling:  I discussed with the patient the risks of Cosentyx including but not limited to worsening of Crohn's disease, immunosuppression, allergic reactions and infections.  The patient understands that monitoring is required including a PPD at baseline and must alert us or the primary physician if symptoms of infection or other concerning signs are noted. Erythromycin Pregnancy And Lactation Text: This medication is Pregnancy Category B and is considered safe during pregnancy. It is also excreted in breast milk. Imiquimod Counseling:  I discussed with the patient the risks of imiquimod including but not limited to erythema, scaling, itching, weeping, crusting, and pain.  Patient understands that the inflammatory response to imiquimod is variable from person to person and was educated regarded proper titration schedule.  If flu-like symptoms develop, patient knows to discontinue the medication and contact us. Glycopyrrolate Counseling:  I discussed with the patient the risks of glycopyrrolate including but not limited to skin rash, drowsiness, dry mouth, difficulty urinating, and blurred vision. Humira Counseling:  I discussed with the patient the risks of adalimumab including but not limited to myelosuppression, immunosuppression, autoimmune hepatitis, demyelinating diseases, lymphoma, and serious infections.  The patient understands that monitoring is required including a PPD at baseline and must alert us or the primary physician if symptoms of infection or other concerning signs are noted. Stelara Counseling:  I discussed with the patient the risks of ustekinumab including but not limited to immunosuppression, malignancy, posterior leukoencephalopathy syndrome, and serious infections.  The patient understands that monitoring is required including a PPD at baseline and must alert us or the primary physician if symptoms of infection or other concerning signs are noted. Benzoyl Peroxide Pregnancy And Lactation Text: This medication is Pregnancy Category C. It is unknown if benzoyl peroxide is excreted in breast milk. Rituxan Pregnancy And Lactation Text: This medication is Pregnancy Category C and it isn't know if it is safe during pregnancy. It is unknown if this medication is excreted in breast milk but similar antibodies are known to be excreted. Detail Level: Detailed Doxycycline Counseling:  Patient counseled regarding possible photosensitivity and increased risk for sunburn.  Patient instructed to avoid sunlight, if possible.  When exposed to sunlight, patients should wear protective clothing, sunglasses, and sunscreen.  The patient was instructed to call the office immediately if the following severe adverse effects occur:  hearing changes, easy bruising/bleeding, severe headache, or vision changes.  The patient verbalized understanding of the proper use and possible adverse effects of doxycycline.  All of the patient's questions and concerns were addressed. Hydroxyzine Counseling: Patient advised that the medication is sedating and not to drive a car after taking this medication.  Patient informed of potential adverse effects including but not limited to dry mouth, urinary retention, and blurry vision.  The patient verbalized understanding of the proper use and possible adverse effects of hydroxyzine.  All of the patient's questions and concerns were addressed. Azithromycin Pregnancy And Lactation Text: This medication is considered safe during pregnancy and is also secreted in breast milk. Benzoyl Peroxide Counseling: Patient counseled that medicine may cause skin irritation and bleach clothing.  In the event of skin irritation, the patient was advised to reduce the amount of the drug applied or use it less frequently.   The patient verbalized understanding of the proper use and possible adverse effects of benzoyl peroxide.  All of the patient's questions and concerns were addressed. Azithromycin Counseling:  I discussed with the patient the risks of azithromycin including but not limited to GI upset, allergic reaction, drug rash, diarrhea, and yeast infections. Methotrexate Pregnancy And Lactation Text: This medication is Pregnancy Category X and is known to cause fetal harm. This medication is excreted in breast milk. Infliximab Counseling:  I discussed with the patient the risks of infliximab including but not limited to myelosuppression, immunosuppression, autoimmune hepatitis, demyelinating diseases, lymphoma, and serious infections.  The patient understands that monitoring is required including a PPD at baseline and must alert us or the primary physician if symptoms of infection or other concerning signs are noted. Eucrisa Counseling: Patient may experience a mild burning sensation during topical application. Eucrisa is not approved in children less than 2 years of age. Acitretin Pregnancy And Lactation Text: This medication is Pregnancy Category X and should not be given to women who are pregnant or may become pregnant in the future. This medication is excreted in breast milk. Ivermectin Counseling:  Patient instructed to take medication on an empty stomach with a full glass of water.  Patient informed of potential adverse effects including but not limited to nausea, diarrhea, dizziness, itching, and swelling of the extremities or lymph nodes.  The patient verbalized understanding of the proper use and possible adverse effects of ivermectin.  All of the patient's questions and concerns were addressed. Hydroxyzine Pregnancy And Lactation Text: This medication is not safe during pregnancy and should not be taken. It is also excreted in breast milk and breast feeding isn't recommended. Simponi Counseling:  I discussed with the patient the risks of golimumab including but not limited to myelosuppression, immunosuppression, autoimmune hepatitis, demyelinating diseases, lymphoma, and serious infections.  The patient understands that monitoring is required including a PPD at baseline and must alert us or the primary physician if symptoms of infection or other concerning signs are noted. Topical Sulfur Applications Counseling: Topical Sulfur Counseling: Patient counseled that this medication may cause skin irritation or allergic reactions.  In the event of skin irritation, the patient was advised to reduce the amount of the drug applied or use it less frequently.   The patient verbalized understanding of the proper use and possible adverse effects of topical sulfur application.  All of the patient's questions and concerns were addressed. Cyclosporine Counseling:  I discussed with the patient the risks of cyclosporine including but not limited to hypertension, gingival hyperplasia,myelosuppression, immunosuppression, liver damage, kidney damage, neurotoxicity, lymphoma, and serious infections. The patient understands that monitoring is required including baseline blood pressure, CBC, CMP, lipid panel and uric acid, and then 1-2 times monthly CMP and blood pressure. Isotretinoin Counseling: Patient should get monthly blood tests, not donate blood, not drive at night if vision affected, not share medication, and not undergo elective surgery for 6 months after tx completed. Side effects reviewed, pt to contact office should one occur. Topical Clindamycin Counseling: Patient counseled that this medication may cause skin irritation or allergic reactions.  In the event of skin irritation, the patient was advised to reduce the amount of the drug applied or use it less frequently.   The patient verbalized understanding of the proper use and possible adverse effects of clindamycin.  All of the patient's questions and concerns were addressed. Cyclophosphamide Pregnancy And Lactation Text: This medication is Pregnancy Category D and it isn't considered safe during pregnancy. This medication is excreted in breast milk. Quinolones Counseling:  I discussed with the patient the risks of fluoroquinolones including but not limited to GI upset, allergic reaction, drug rash, diarrhea, dizziness, photosensitivity, yeast infections, liver function test abnormalities, tendonitis/tendon rupture. Prednisone Counseling:  I discussed with the patient the risks of prolonged use of prednisone including but not limited to weight gain, insomnia, osteoporosis, mood changes, diabetes, susceptibility to infection, glaucoma and high blood pressure.  In cases where prednisone use is prolonged, patients should be monitored with blood pressure checks, serum glucose levels and an eye exam.  Additionally, the patient may need to be placed on GI prophylaxis, PCP prophylaxis, and calcium and vitamin D supplementation and/or a bisphosphonate.  The patient verbalized understanding of the proper use and the possible adverse effects of prednisone.  All of the patient's questions and concerns were addressed. Otezla Pregnancy And Lactation Text: This medication is Pregnancy Category C and it isn't known if it is safe during pregnancy. It is unknown if it is excreted in breast milk. Birth Control Pills Pregnancy And Lactation Text: This medication should be avoided if pregnant and for the first 30 days post-partum. Dupixent Pregnancy And Lactation Text: This medication likely crosses the placenta but the risk for the fetus is uncertain. This medication is excreted in breast milk. Minocycline Counseling: Patient advised regarding possible photosensitivity and discoloration of the teeth, skin, lips, tongue and gums.  Patient instructed to avoid sunlight, if possible.  When exposed to sunlight, patients should wear protective clothing, sunglasses, and sunscreen.  The patient was instructed to call the office immediately if the following severe adverse effects occur:  hearing changes, easy bruising/bleeding, severe headache, or vision changes.  The patient verbalized understanding of the proper use and possible adverse effects of minocycline.  All of the patient's questions and concerns were addressed. Cimzia Counseling:  I discussed with the patient the risks of Cimzia including but not limited to immunosuppression, allergic reactions and infections.  The patient understands that monitoring is required including a PPD at baseline and must alert us or the primary physician if symptoms of infection or other concerning signs are noted. Doxepin Counseling:  Patient advised that the medication is sedating and not to drive a car after taking this medication. Patient informed of potential adverse effects including but not limited to dry mouth, urinary retention, and blurry vision.  The patient verbalized understanding of the proper use and possible adverse effects of doxepin.  All of the patient's questions and concerns were addressed. Metronidazole Pregnancy And Lactation Text: This medication is Pregnancy Category B and considered safe during pregnancy.  It is also excreted in breast milk. Siliq Counseling:  I discussed with the patient the risks of Siliq including but not limited to new or worsening depression, suicidal thoughts and behavior, immunosuppression, malignancy, posterior leukoencephalopathy syndrome, and serious infections.  The patient understands that monitoring is required including a PPD at baseline and must alert us or the primary physician if symptoms of infection or other concerning signs are noted. There is also a special program designed to monitor depression which is required with Siliq. Xeljanz Counseling: I discussed with the patient the risks of Xeljanz therapy including increased risk of infection, liver issues, headache, diarrhea, or cold symptoms. Live vaccines should be avoided. They were instructed to call if they have any problems. Ketoconazole Pregnancy And Lactation Text: This medication is Pregnancy Category C and it isn't know if it is safe during pregnancy. It is also excreted in breast milk and breast feeding isn't recommended. Protopic Counseling: Patient may experience a mild burning sensation during topical application. Protopic is not approved in children less than 2 years of age. There have been case reports of hematologic and skin malignancies in patients using topical calcineurin inhibitors although causality is questionable. Sarecycline Counseling: Patient advised regarding possible photosensitivity and discoloration of the teeth, skin, lips, tongue and gums.  Patient instructed to avoid sunlight, if possible.  When exposed to sunlight, patients should wear protective clothing, sunglasses, and sunscreen.  The patient was instructed to call the office immediately if the following severe adverse effects occur:  hearing changes, easy bruising/bleeding, severe headache, or vision changes.  The patient verbalized understanding of the proper use and possible adverse effects of sarecycline.  All of the patient's questions and concerns were addressed. Valtrex Pregnancy And Lactation Text: this medication is Pregnancy Category B and is considered safe during pregnancy. This medication is not directly found in breast milk but it's metabolite acyclovir is present. Gabapentin Counseling: I discussed with the patient the risks of gabapentin including but not limited to dizziness, somnolence, fatigue and ataxia.

## 2024-05-02 NOTE — TELEPHONE ENCOUNTER
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    So will continue budesonide trial.      Her elastase in stool came back LOW which could be a sign of EPI. She has no weight loss or steatorrhea.    Prior smoker. Plan for CT abodmen with IV contrast.    She has hives to iodine before but NO aanalphylaxis or trouble breathing.      Plan:  1. CT abdomen with IV contrast  2. Prednisone 50mg,  by mouth at 13 hours, 7 hours, and 1 hour before  contrast media injection. She agrees to plan, risks/benefits explained. Plus benadryl as well.   3. If not improvement on budesonide, will plan for pancrelipase.        GI Staff:   Please mychart instructions on how to get CT abdomen. Please remind her not to drive since she will need to take benadryl. Better to have a .

## 2024-05-14 ENCOUNTER — TELEPHONE (OUTPATIENT)
Facility: CLINIC | Age: 79
End: 2024-05-14

## 2024-05-14 NOTE — TELEPHONE ENCOUNTER
Patient is wondering if she should still take budesonide on the morning of the upcoming CT scan please call

## 2024-05-17 ENCOUNTER — HOSPITAL ENCOUNTER (OUTPATIENT)
Dept: CT IMAGING | Facility: HOSPITAL | Age: 79
Discharge: HOME | End: 2024-05-17
Attending: INTERNAL MEDICINE

## 2024-05-17 DIAGNOSIS — K86.1 CHRONIC PANCREATITIS, UNSPECIFIED PANCREATITIS TYPE (HCC): ICD-10-CM

## 2024-05-17 DIAGNOSIS — K52.9 CHRONIC DIARRHEA: ICD-10-CM

## 2024-05-17 LAB
CREAT BLD-MCNC: 0.7 MG/DL
EGFRCR SERPLBLD CKD-EPI 2021: 90 ML/MIN/1.73M2 (ref 60–?)

## 2024-05-17 PROCEDURE — 74170 CT ABD WO CNTRST FLWD CNTRST: CPT | Performed by: INTERNAL MEDICINE

## 2024-05-17 PROCEDURE — 82565 ASSAY OF CREATININE: CPT

## 2024-05-20 ENCOUNTER — TELEPHONE (OUTPATIENT)
Age: 79
End: 2024-05-20

## 2024-05-20 NOTE — TELEPHONE ENCOUNTER
CONCLUSION:   1. Mild fatty involution again seen throughout the pancreas, unchanged and likely age related.  The pancreas is otherwise grossly unremarkable without solid mass, ductal dilatation, peripancreatic fluid collection or calcifications.   2. Liver remarkable for stable mild steatosis and clustered coarse calcifications in the right hepatic lobe, the latter of which probably represent chronic sequela of a healed granulomatous process.  Note the liver is not cirrhotic morphologically and no    suspicious/enhancing liver masses are seen to suggest hepatoma.   3. Cholelithiasis.  No biliary ductal dilatation.   4. Duodenal diverticulum.   5. Lesser incidental findings as above.       >>Left VM for patient, no concerning pancreatic lesions given hx of EPI. Fatty change noted, Steatosis noted. Cholelithiasis- has been asx.     F/u with me in clinic.

## 2024-09-19 ENCOUNTER — OFFICE VISIT (OUTPATIENT)
Facility: CLINIC | Age: 79
End: 2024-09-19
Payer: MEDICARE

## 2024-09-19 ENCOUNTER — TELEPHONE (OUTPATIENT)
Facility: CLINIC | Age: 79
End: 2024-09-19

## 2024-09-19 VITALS
BODY MASS INDEX: 33.43 KG/M2 | DIASTOLIC BLOOD PRESSURE: 86 MMHG | HEART RATE: 83 BPM | WEIGHT: 200.63 LBS | SYSTOLIC BLOOD PRESSURE: 144 MMHG | HEIGHT: 65 IN

## 2024-09-19 DIAGNOSIS — K52.9 CHRONIC DIARRHEA: ICD-10-CM

## 2024-09-19 DIAGNOSIS — K86.81 EXOCRINE PANCREATIC INSUFFICIENCY (HCC): Primary | ICD-10-CM

## 2024-09-19 DIAGNOSIS — R19.5 LOW FECAL ELASTASE LEVEL: ICD-10-CM

## 2024-09-19 PROCEDURE — 99214 OFFICE O/P EST MOD 30 MIN: CPT | Performed by: INTERNAL MEDICINE

## 2024-09-19 NOTE — PATIENT INSTRUCTIONS
Start pancrelipase - 2 capsules with each meal (take with food) and notify me in 1 month how you are doing  Okay for imodium full or 1/2 tab daily as needed

## 2024-09-19 NOTE — PROGRESS NOTES
[facility] - Gastroenterology                                                                                                      Clinic Follow-up Visit    Chief Complaint   Patient presents with    Follow - Up         Subjective/HPI:   Mary Leyva is a 79, year old year-old female with history of hx of uterine cancer, visual impairment, who presents for fu diarrhea/fecal incontinence. Dx with exocrine pancreatic insufficiency.  Asymptomatic gallstones.       SINCE LAST VISIT:  -fecal elastase low-->CT AP done as below  -prednisone for empiric trial to treat microscopic colitis DID NOT IMPROVE sx, so doubt microscopic colitis  -still having issues with incontinence and diarrhea at times  -better with imodium prn  -no melena or hematochezia  -no cp/sob  -no unsual weight loss  -her  had a lot of medical issues this year      INITIAL VISIT (2023)  -she has been having issues with diarrhea, and fecal incontinence  -always has to know where a bathroom is  -she went for pelvic floor PT to help this condition  -She says that she does have occasional solid stools but most the time they are loose and sometimes when she is out to dinner she has to run to the toilet, has had episodes of fecal incontinence  - She had her prior colonoscopies at [facility] and therefore is seeing [facility] for follow-up, unclear if they are aware of her diarrhea issues but I asked her to inform them about this  - She occasionally gets some rashes and she has facial rosacea, unclear if this is related to food allergies  - No melena or hematochezia  - No chest pain or shortness of breath  - Denies any family history of GI malignancy  - She is here with her  who is also worried about her diarrhea  - Oh weight loss issues, has had had some weight gain issues  - No dizziness  - No nocturnal stools    CT AP (2024)  CONCLUSION:   1. Mild fatty involution  again seen throughout the pancreas, unchanged and likely age related.  The pancreas is otherwise grossly unremarkable without solid mass, ductal dilatation, peripancreatic fluid collection or calcifications.   2. Liver remarkable for stable mild steatosis and clustered coarse calcifications in the right hepatic lobe, the latter of which probably represent chronic sequela of a healed granulomatous process.  Note the liver is not cirrhotic morphologically and no    suspicious/enhancing liver masses are seen to suggest hepatoma.   3. Cholelithiasis.  No biliary ductal dilatation.   4. Duodenal diverticulum.   5. Lesser incidental findings as above.      MRI liver (2018)  CONCLUSION:   1. Hepatomegaly with severe hepatic steatosis.      2. No focal hepatic lesions are identified.      3. Extensive bilateral lower lobe airspace disease is noted. This might represent atelectasis, but would be better assessed by chest CT if clinically warranted.      4. Lesser incidental findings as above.         Prior endoscopies:  2023 DANITZA BROWN  FINAL DIAGNOSIS   A.  Colon biopsy, random:  Colonic mucosa showing focal hyperplastic changes.    No active colitis or dysplasia.     B.  Colon polyp, proximal transverse x2:  Tubular adenoma and sessile serrated    adenoma.  No high grade dysplasia.        Soc:  -no smoking  -no Etoh        Wt Readings from Last 6 Encounters:   09/19/24 200 lb 9.6 oz (91 kg)   04/19/24 205 lb 12.8 oz (93.4 kg)   04/04/24 204 lb (92.5 kg)   12/11/23 202 lb 6.4 oz (91.8 kg)   12/06/23 202 lb (91.6 kg)   11/02/23 204 lb (92.5 kg)        History, Medications, Allergies, ROS:      Past Medical History:    Allergic rhinitis    Arthritis    Cancer (HCC)    uriterine cancer    Hemorrhoids    History of uterine cancer    Visual impairment    wears glassses      Past Surgical History:   Procedure Laterality Date    Anesth,radical hysterectomy      Colonoscopy  11/14/2022    Hysterectomy      Other Right     knee  meniscus repair    Removal of lung,lobectomy      Total abdom hysterectomy        Family History   Problem Relation Age of Onset    Heart Disease Father     Heart Disease Brother       Social History:   Social History     Socioeconomic History    Marital status:    Tobacco Use    Smoking status: Former     Current packs/day: 0.00     Average packs/day: 0.5 packs/day for 50.0 years (25.0 ttl pk-yrs)     Types: Cigarettes     Quit date: 1972     Years since quittin.7    Smokeless tobacco: Never    Tobacco comments:     never liked smoking   Vaping Use    Vaping status: Never Used   Substance and Sexual Activity    Alcohol use: Yes     Comment: glass of wine now and then    Drug use: Never   Other Topics Concern    Caffeine Concern Yes     Comment: Chocolate    Right Handed Yes   Social History Narrative    Lives with     No children    Part-time teacher, 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      Social Determinants of Health      [facility]    Social Connections    [facility] from [facility], [facility]    Housing Stability        Medications (Active prior to today's visit):  Current Outpatient Medications   Medication Sig Dispense Refill    Pancrelipase, Lip-Prot-Amyl, 56327-92042 units Oral Cap DR Particles Take 2 capsules by mouth 3 (three) times daily with meals. 180 capsule 0    Ibuprofen (ADVIL OR) Take by mouth.      Ergocalciferol (VITAMIN D OR) Take 5,000 Units by mouth.         Allergies:  Allergies   Allergen Reactions    Iodine (Topical) HIVES    Radiology Contrast Iodinated Dyes OTHER (SEE COMMENTS)       ROS:   CONSTITUTIONAL:  negative for fevers, chills  EYES:  negative for change in vision  RESPIRATORY:  negative for  shortness of breath  CARDIOVASCULAR:  negative for  chest pain  GASTROINTESTINAL:  see HPI  GENITOURINARY:  negative for dysuria  INTEGUMENT/BREAST:   SKIN:  negative for  rash  ALLERGIC/IMMUNOLOGIC:  negative for hay fever  ENDOCRINE:  negative for cold intolerance and heat intolerance  MUSCULOSKELETAL:  negative for  joint stiffness and joint swelling  BEHAVIOR/PSYCH:  negative for depressed mood    PHYSICAL EXAM:   Blood pressure 144/86, pulse 83, height 5' 5\" (1.651 m), weight 200 lb 9.6 oz (91 kg), not currently breastfeeding.    Gen- Patient appears comfortable and in no acute discomfort  HEENT: the sclera appears anicteric, oropharynx clear, mucus membranes appear moist  CV- regular rate and rhythm, the extremities are warm and well perfused   Lung- Moves air well; No labored breathing  Abdomen- soft, non-tender exam in all quadrants without rigidity or guarding, non-distended, no abnormal bowel sounds noted, no masses are palpated  Skin- No jaundice  Ext: no cyanosis, clubbing or edema is evident.   Neuro- Alert and oriented x4, and patient is having movement of all 4 extremities   Psych - appropriate, non-agitated    Labs/Imaging:     Patient's labs and imaging were reviewed and discussed with patient today.     .  ASSESSMENT/PLAN:   Mary Leyva is a 79, year old year-old female with history of hx of uterine cancer, visual impairment, who presents for fu diarrhea/fecal incontinence. Dx with exocrine pancreatic insufficiency.  Asymptomatic gallstones.     #Chronic diarrhea  #Fecal incontinence  #Rash - improved, allergic rx  #NAFLD- LFTs remain normal. CTM  #Cholelithiasis - asx, d/w patient if ab pain, notify me   #Exocrine pancreatic insufficiency    >>Prednisone did not improve diarrhea, likely not microscopic colitis  >>EPI, trial of pancrelipase discussed       Start pancrelipase - 2 capsules with each meal (take with food) and notify me in 1 month how you are doing  Okay for imodium full or 1/2 tab daily as needed        Orders This Visit:  No orders of the defined types were placed in this encounter.      Meds This Visit:  Requested Prescriptions      Signed Prescriptions Disp Refills    Pancrelipase, Lip-Prot-Amyl, 84435-21652 units Oral Cap DR Particles 180 capsule 0     Sig: Take 2 capsules by mouth 3 (three) times daily with meals.       Imaging & Referrals:  None     4/19/2024    NOLAN Stern MD  Pager: [phone]        This note was partially prepared using Dragon Medical voice recognition dictation software. As a result, errors may occur. When identified, these errors have been corrected. While every attempt is made to correct errors during dictation, discrepancies may still exist.

## 2024-09-20 NOTE — TELEPHONE ENCOUNTER
Porsha Curry was denied by insurance. I've attached the reasoning below. Please advise on next steps.

## 2024-09-24 RX ORDER — PANCRELIPASE 36000; 180000; 114000 [USP'U]/1; [USP'U]/1; [USP'U]/1
2 CAPSULE, DELAYED RELEASE PELLETS ORAL
Qty: 180 CAPSULE | Refills: 3 | Status: SHIPPED | OUTPATIENT
Start: 2024-09-24 | End: 2024-10-24

## 2024-09-24 NOTE — TELEPHONE ENCOUNTER
LILI RN Staff:   Looks like insurance company wants me to try Creon or Zenpep first, so I sent Creon to pharmacy. Thanks

## 2024-09-26 NOTE — TELEPHONE ENCOUNTER
Spoke to patient and reviewed Creon was sent. She is not sure this will be less expensive due to needing to meet deductible. She's doing well right now, following diet recommendations.     She will contact us with an update if medication is too expensive and unable to purchase.

## 2024-10-09 NOTE — LETTER
01/22/20        Tata Mackenzie  [address] [facility]      Dear Misha Eagle records indicate that you have outstanding lab work and or testing that was ordered for you and has not yet been completed:  Orders Placed This Encounter      Lipid P Asymptomatic  HR 30 pre-ERCP, procedure cancelled 10/8/24  CV following

## 2024-12-10 ENCOUNTER — TELEPHONE (OUTPATIENT)
Dept: INTERNAL MEDICINE CLINIC | Facility: CLINIC | Age: 79
End: 2024-12-10

## 2024-12-10 NOTE — TELEPHONE ENCOUNTER
Patient was returning a missed call and is not sure if it was from Dr. Acosta's office.   No message was left.    Patient confirmed an appointment scheduled for 12/12/24 with YAMILE Watters

## 2024-12-10 NOTE — TELEPHONE ENCOUNTER
Chart reviewed. No recent communication or encounter found. No further action needed. Closing encounter.

## 2024-12-12 ENCOUNTER — MED REC SCAN ONLY (OUTPATIENT)
Dept: INTERNAL MEDICINE CLINIC | Facility: CLINIC | Age: 79
End: 2024-12-12

## 2024-12-12 ENCOUNTER — OFFICE VISIT (OUTPATIENT)
Dept: INTERNAL MEDICINE CLINIC | Facility: CLINIC | Age: 79
End: 2024-12-12
Payer: MEDICARE

## 2024-12-12 ENCOUNTER — LAB ENCOUNTER (OUTPATIENT)
Dept: LAB | Age: 79
End: 2024-12-12
Payer: MEDICARE

## 2024-12-12 VITALS
OXYGEN SATURATION: 95 % | SYSTOLIC BLOOD PRESSURE: 124 MMHG | DIASTOLIC BLOOD PRESSURE: 84 MMHG | HEIGHT: 65 IN | HEART RATE: 101 BPM | BODY MASS INDEX: 33.82 KG/M2 | WEIGHT: 203 LBS

## 2024-12-12 DIAGNOSIS — E66.811 CLASS 1 OBESITY DUE TO EXCESS CALORIES WITHOUT SERIOUS COMORBIDITY WITH BODY MASS INDEX (BMI) OF 33.0 TO 33.9 IN ADULT: ICD-10-CM

## 2024-12-12 DIAGNOSIS — R15.9 INCONTINENCE OF FECES WITH FECAL URGENCY: ICD-10-CM

## 2024-12-12 DIAGNOSIS — R73.03 PREDIABETES: ICD-10-CM

## 2024-12-12 DIAGNOSIS — M65.331 TRIGGER MIDDLE FINGER OF RIGHT HAND: ICD-10-CM

## 2024-12-12 DIAGNOSIS — R15.2 INCONTINENCE OF FECES WITH FECAL URGENCY: ICD-10-CM

## 2024-12-12 DIAGNOSIS — E55.9 VITAMIN D DEFICIENCY: ICD-10-CM

## 2024-12-12 DIAGNOSIS — E53.8 B12 DEFICIENCY: ICD-10-CM

## 2024-12-12 DIAGNOSIS — E78.00 PURE HYPERCHOLESTEROLEMIA: ICD-10-CM

## 2024-12-12 DIAGNOSIS — I70.0 ATHEROSCLEROSIS OF AORTA (HCC): ICD-10-CM

## 2024-12-12 DIAGNOSIS — H61.23 BILATERAL IMPACTED CERUMEN: ICD-10-CM

## 2024-12-12 DIAGNOSIS — E66.09 CLASS 1 OBESITY DUE TO EXCESS CALORIES WITHOUT SERIOUS COMORBIDITY WITH BODY MASS INDEX (BMI) OF 33.0 TO 33.9 IN ADULT: ICD-10-CM

## 2024-12-12 DIAGNOSIS — Z00.00 ENCOUNTER FOR ANNUAL HEALTH EXAMINATION: Primary | ICD-10-CM

## 2024-12-12 DIAGNOSIS — Z00.00 ENCOUNTER FOR ANNUAL HEALTH EXAMINATION: ICD-10-CM

## 2024-12-12 DIAGNOSIS — K52.9 CHRONIC DIARRHEA: ICD-10-CM

## 2024-12-12 DIAGNOSIS — D69.2 SENILE PURPURA (HCC): ICD-10-CM

## 2024-12-12 DIAGNOSIS — R16.0 LIVER MASS: ICD-10-CM

## 2024-12-12 DIAGNOSIS — R19.5 LOW FECAL ELASTASE LEVEL: ICD-10-CM

## 2024-12-12 DIAGNOSIS — K76.0 NAFLD (NONALCOHOLIC FATTY LIVER DISEASE): ICD-10-CM

## 2024-12-12 DIAGNOSIS — K86.81 EXOCRINE PANCREATIC INSUFFICIENCY (HCC): ICD-10-CM

## 2024-12-12 LAB
ALBUMIN SERPL-MCNC: 4.3 G/DL (ref 3.2–4.8)
ALBUMIN/GLOB SERPL: 2 {RATIO} (ref 1–2)
ALP LIVER SERPL-CCNC: 76 U/L
ALT SERPL-CCNC: 22 U/L
ANION GAP SERPL CALC-SCNC: 7 MMOL/L (ref 0–18)
AST SERPL-CCNC: 32 U/L (ref ?–34)
BASOPHILS # BLD AUTO: 0.05 X10(3) UL (ref 0–0.2)
BASOPHILS NFR BLD AUTO: 0.8 %
BILIRUB SERPL-MCNC: 0.7 MG/DL (ref 0.2–1.1)
BUN BLD-MCNC: 14 MG/DL (ref 9–23)
BUN/CREAT SERPL: 25.9 (ref 10–20)
CALCIUM BLD-MCNC: 9.1 MG/DL (ref 8.7–10.4)
CHLORIDE SERPL-SCNC: 106 MMOL/L (ref 98–112)
CHOLEST SERPL-MCNC: 212 MG/DL (ref ?–200)
CO2 SERPL-SCNC: 28 MMOL/L (ref 21–32)
CREAT BLD-MCNC: 0.54 MG/DL
DEPRECATED RDW RBC AUTO: 44.9 FL (ref 35.1–46.3)
EGFRCR SERPLBLD CKD-EPI 2021: 95 ML/MIN/1.73M2 (ref 60–?)
EOSINOPHIL # BLD AUTO: 0.12 X10(3) UL (ref 0–0.7)
EOSINOPHIL NFR BLD AUTO: 1.9 %
ERYTHROCYTE [DISTWIDTH] IN BLOOD BY AUTOMATED COUNT: 12.4 % (ref 11–15)
EST. AVERAGE GLUCOSE BLD GHB EST-MCNC: 120 MG/DL (ref 68–126)
FASTING PATIENT LIPID ANSWER: YES
FASTING STATUS PATIENT QL REPORTED: YES
GLOBULIN PLAS-MCNC: 2.1 G/DL (ref 2–3.5)
GLUCOSE BLD-MCNC: 113 MG/DL (ref 70–99)
HBA1C MFR BLD: 5.8 % (ref ?–5.7)
HCT VFR BLD AUTO: 46.3 %
HDLC SERPL-MCNC: 50 MG/DL (ref 40–59)
HGB BLD-MCNC: 16 G/DL
IMM GRANULOCYTES # BLD AUTO: 0.01 X10(3) UL (ref 0–1)
IMM GRANULOCYTES NFR BLD: 0.2 %
LDLC SERPL CALC-MCNC: 126 MG/DL (ref ?–100)
LYMPHOCYTES # BLD AUTO: 1.63 X10(3) UL (ref 1–4)
LYMPHOCYTES NFR BLD AUTO: 25.4 %
MCH RBC QN AUTO: 33.8 PG (ref 26–34)
MCHC RBC AUTO-ENTMCNC: 34.6 G/DL (ref 31–37)
MCV RBC AUTO: 97.7 FL
MONOCYTES # BLD AUTO: 0.53 X10(3) UL (ref 0.1–1)
MONOCYTES NFR BLD AUTO: 8.3 %
NEUTROPHILS # BLD AUTO: 4.07 X10 (3) UL (ref 1.5–7.7)
NEUTROPHILS # BLD AUTO: 4.07 X10(3) UL (ref 1.5–7.7)
NEUTROPHILS NFR BLD AUTO: 63.4 %
NONHDLC SERPL-MCNC: 162 MG/DL (ref ?–130)
OSMOLALITY SERPL CALC.SUM OF ELEC: 293 MOSM/KG (ref 275–295)
PLATELET # BLD AUTO: 271 10(3)UL (ref 150–450)
POTASSIUM SERPL-SCNC: 4.3 MMOL/L (ref 3.5–5.1)
PROT SERPL-MCNC: 6.4 G/DL (ref 5.7–8.2)
RBC # BLD AUTO: 4.74 X10(6)UL
SODIUM SERPL-SCNC: 141 MMOL/L (ref 136–145)
TRIGL SERPL-MCNC: 206 MG/DL (ref 30–149)
TSI SER-ACNC: 3.53 UIU/ML (ref 0.55–4.78)
VIT B12 SERPL-MCNC: 239 PG/ML (ref 211–911)
VIT D+METAB SERPL-MCNC: 22.5 NG/ML (ref 30–100)
VLDLC SERPL CALC-MCNC: 37 MG/DL (ref 0–30)
WBC # BLD AUTO: 6.4 X10(3) UL (ref 4–11)

## 2024-12-12 PROCEDURE — 84443 ASSAY THYROID STIM HORMONE: CPT

## 2024-12-12 PROCEDURE — 82607 VITAMIN B-12: CPT

## 2024-12-12 PROCEDURE — 80061 LIPID PANEL: CPT

## 2024-12-12 PROCEDURE — 36415 COLL VENOUS BLD VENIPUNCTURE: CPT

## 2024-12-12 PROCEDURE — 80053 COMPREHEN METABOLIC PANEL: CPT

## 2024-12-12 PROCEDURE — 85025 COMPLETE CBC W/AUTO DIFF WBC: CPT

## 2024-12-12 PROCEDURE — 83036 HEMOGLOBIN GLYCOSYLATED A1C: CPT

## 2024-12-12 PROCEDURE — 82306 VITAMIN D 25 HYDROXY: CPT

## 2024-12-12 NOTE — PROGRESS NOTES
Subjective:   Mary Leyva is a 79 year old female who presents for a Medicare Subsequent Annual Wellness visit (Pt already had Initial Annual Wellness) and scheduled follow up of multiple significant but stable problems.     Presents for annual visit   No concerns today   Feeling a little tired at times   Would like to lose a little weight, could be more active   Diet is good, cooks at home, goes out now and then   Didn't get the pancrelipase yet, will get it in January   No longer seeing GI specialist at [facility], sees one at [place]    HISTORY 2023  Pt comes for her medicare physical  A lot going on-  had some prostate issues and then needed hernia surg and found to have a blood clot in the lung and on eliquis for a fib as well   Sister passed away in may      HISTORY   New pt   C/c establish care - used to see dr mustafa   C/o noted bp is high today and she states she is trying to watch her salt   Takes a lot supplements   Saw gi for stool incont and went for PT and saw GI  at [facility]   Fatty liver-has seen Dr. zaidi - sees him once a yr - normal for age      PMH  htn   Prediabetes   Fatty liver   HL  Uterine cancer s/p surg chemo and RT -krissy rodrigues  (now retired ) at [facility]   Metastatic lesion of the uterine cancer to the lung status post lobectomy right side by Dr. Miller at the [facility]  Obese   H/o stool incont s/p PT   Vit d def -      Lives with  , retired - used to work as a      History/Other:   Fall Risk Assessment:   She has been screened for Falls and is low risk.      Cognitive Assessment:   She had a completely normal cognitive assessment - see flowsheet entries     Functional Ability/Status:   Mary Leyva has a completely normal functional assessment. See flowsheet for details.    Depression Screening (PHQ):  PHQ-2 SCORE: 0  , done 12/5/2024     Advanced Directives:   She does NOT have a Living Will. [Do you have a living will?: (Patient-Rptd) No]  She  does have a POA but we do NOT have it on file in Epic.    Discussed Advance Care Planning with patient (and family/surrogate if present). Standard forms made available to patient in After Visit Summary.      Patient Active Problem List   Diagnosis    Obesity due to excess calories    Pure hypercholesterolemia    Incontinence of feces with fecal urgency    Vitamin D deficiency    Prediabetes    Liver mass    NAFLD (nonalcoholic fatty liver disease)    Senile purpura (HCC)    Atherosclerosis of aorta (HCC)    Chronic diarrhea    Exocrine pancreatic insufficiency (HCC)    Low fecal elastase level     Allergies:  She is allergic to iodine (topical) and radiology contrast iodinated dyes.    Current Medications:  Outpatient Medications Marked as Taking for the 12/12/24 encounter (Office Visit) with [facility] Watters APRN   Medication Sig    Ibuprofen (ADVIL OR) Take by mouth.    Ergocalciferol (VITAMIN D OR) Take 5,000 Units by mouth.     Medical History:  She  has a past medical history of Allergic rhinitis (spring time..if needed, I take Claritin), Arthritis (knees), Cancer (HCC), Hemorrhoids, History of uterine cancer (1981), and Visual impairment.  Surgical History:  She  has a past surgical history that includes anesth,radical hysterectomy; removal of lung,lobectomy (1990); hysterectomy; other (Right); colonoscopy (11/14/2022); and total abdom hysterectomy.   Family History:  Her family history includes Heart Disease in her brother and father.  Social History:  She  reports that she quit smoking about 52 years ago. Her smoking use included cigarettes. She has a 25 pack-year smoking history. She has never used smokeless tobacco. She reports current alcohol use. She reports that she does not use drugs.    Tobacco:  She smoked tobacco in the past but quit greater than 12 months ago.  Social History     Tobacco Use   Smoking Status Former    Current packs/day: 0.00    Average packs/day: 0.5 packs/day for 50.0 years (25.0 ttl  pk-yrs)    Types: Cigarettes    Quit date: 1972    Years since quittin.9   Smokeless Tobacco Never   Tobacco Comments    never liked smoking          CAGE Alcohol Screen:   CAGE screening score of 0 on 2024, showing low risk of alcohol abuse.      Patient Care Team:  Nohemy Acosta MD as PCP - General (Internal Medicine)  Lyle Lorenzana (GASTROENTEROLOGY)    Review of Systems  10 point review of systems otherwise negative with the exception of HPI and assessment and plan    Objective:   Physical Exam  Vitals reviewed.   Constitutional:       General: She is not in acute distress.     Appearance: Normal appearance. She is well-developed.   HENT:      Right Ear: There is impacted cerumen.      Left Ear: There is impacted cerumen.      Mouth/Throat:      Mouth: Mucous membranes are moist.      Pharynx: Oropharynx is clear.   Cardiovascular:      Rate and Rhythm: Normal rate and regular rhythm.      Heart sounds: Normal heart sounds.   Pulmonary:      Effort: Pulmonary effort is normal.      Breath sounds: Normal breath sounds.   Abdominal:      General: Bowel sounds are normal.      Palpations: Abdomen is soft.   Lymphadenopathy:      Cervical: No cervical adenopathy.   Skin:     General: Skin is warm and dry.   Neurological:      Mental Status: She is alert and oriented to person, place, and time.           /84   Pulse 101   Ht 5' 5\" (1.651 m)   Wt 203 lb (92.1 kg)   LMP  (LMP Unknown)   SpO2 95%   BMI 33.78 kg/m²  Estimated body mass index is 33.78 kg/m² as calculated from the following:    Height as of this encounter: 5' 5\" (1.651 m).    Weight as of this encounter: 203 lb (92.1 kg).    Medicare Hearing Assessment:   Hearing Screening    Screening Method: Questionnaire  I have a problem hearing over the telephone: No I have trouble following the conversations when two or more people are talking at the same time: No   I have trouble understanding things on the TV: No I have to strain to  understand conversations: No   I have to worry about missing the telephone ring or doorbell: No I have trouble hearing conversations in a noisy background such as a crowded room or restaurant: No   I get confused about where sounds come from: No I misunderstand some words in a sentence and need to ask people to repeat themselves: No   I especially have trouble understanding the speech of women and children: No I have trouble understanding the speaker in a large room such as at a meeting or place of Sikh: No   Many people I talk to seem to mumble (or don't speak clearly): No People get annoyed because I misunderstand what they say: No   I misunderstand what others are saying and make inappropriate responses: No I avoid social activities because I cannot hear well and fear I will reply improperly: No   Family members and friends have told me they think I may have hearing loss: No                   Assessment & Plan:   Mary Leyva is a 79 year old female who presents for a Medicare Assessment.     1. Encounter for annual health examination (Primary)  -     CBC With Differential With Platelet; Future; Expected date: 12/12/2024  -     Comp Metabolic Panel (14); Future; Expected date: 12/12/2024  -     Lipid Panel; Future; Expected date: 12/12/2024  -     TSH W Reflex To Free T4; Future; Expected date: 12/12/2024  2. Trigger middle finger of right hand  stable  3. Vitamin D deficiency  -     Vitamin D; Future; Expected date: 12/12/2024  Stable, monitor, continue OTC Vitamin D  4. B12 deficiency  -     Vitamin B12; Future; Expected date: 12/12/2024  5. Prediabetes  Check A1c  6. Atherosclerosis of aorta (HCC)  Overview:  Seen on ct 2017  Not on aspirin or statin   Check lipid panel  7. Senile purpura (HCC)  stable  8. Pure hypercholesterolemia  Recheck, not on meds   Healthy diet low in saturated fat  Increase activity   9. Class 1 obesity due to excess calories without serious comorbidity with body mass index (BMI) of  33.0 to 33.9 in adult  Healthy diet low in saturated fat  Increase activity   10. Chronic diarrhea  F/u with GI  11. Incontinence of feces with fecal urgency  F/u with GI  12. Liver mass  F/u with GI  CT abdomen 5/2024  13. NAFLD (nonalcoholic fatty liver disease)  US liver 12/2023, CT abdomen 5/2024  F/u with GI  Labs today   14. Exocrine pancreatic insufficiency (HCC)  F/u with GI  15. Low fecal elastase level  F/u with GI  16. Bilateral impacted cerumen  -     ENT Referral - [facility])    Preventive medicine   mammogram December 2022  seen in care everywhere  dexa normal 6/2022 normal   Colonoscopy - Hartman , and then dr cárdenas 11/14/2023    Labs today    The patient indicates understanding of these issues and agrees to the plan.  Reinforced healthy diet, lifestyle, and exercise.      Return in 6 months (on 6/12/2025).     BELLA Lechuga, 12/12/2024     Supplementary Documentation:   General Health:  In the past six months, have you lost more than 10 pounds without trying?: (Patient-Rptd) 2 - No  Has your appetite been poor?: (Patient-Rptd) No  Type of Diet: (Patient-Rptd) Balanced  How does the patient maintain a good energy level?: (Patient-Rptd) Other  How would you describe your daily physical activity?: (Patient-Rptd) Light  How would you describe your current health state?: (Patient-Rptd) Good  How do you maintain positive mental well-being?: (Patient-Rptd) Social Interaction;Puzzles;Games;Visiting Friends;Visiting Family  On a scale of 0 to 10, with 0 being no pain and 10 being severe pain, what is your pain level?: (Patient-Rptd) 1 - (Mild)  In the past six months, have you experienced urine leakage?: (Patient-Rptd) 0-No  At any time do you feel concerned for the safety/well-being of yourself and/or your children, in your home or elsewhere?: (Patient-Rptd) No  Have you had any immunizations at another office such as Influenza, Hepatitis B, Tetanus, or Pneumococcal?: (Patient-Rptd)  Yes    Health Maintenance   Topic Date Due    Annual Physical  12/06/2024    Influenza Vaccine  Completed    DEXA Scan  Completed    Annual Depression Screening  Completed    Fall Risk Screening (Annual)  Completed    Pneumococcal Vaccine: 65+ Years  Completed    Zoster Vaccines  Completed    COVID-19 Vaccine  Completed    Colorectal Cancer Screening  Discontinued    Mammogram  Discontinued

## 2024-12-14 DIAGNOSIS — E55.9 VITAMIN D DEFICIENCY: Primary | ICD-10-CM

## 2024-12-14 RX ORDER — ERGOCALCIFEROL 1.25 MG/1
50000 CAPSULE, LIQUID FILLED ORAL WEEKLY
Qty: 4 CAPSULE | Refills: 3 | Status: SHIPPED | OUTPATIENT
Start: 2024-12-14

## 2025-01-09 ENCOUNTER — OFFICE VISIT (OUTPATIENT)
Dept: SURGERY | Facility: CLINIC | Age: 80
End: 2025-01-09

## 2025-01-09 DIAGNOSIS — K74.00 HEPATIC FIBROSIS: ICD-10-CM

## 2025-01-09 DIAGNOSIS — K75.81 METABOLIC DYSFUNCTION-ASSOCIATED STEATOHEPATITIS (MASH): Primary | ICD-10-CM

## 2025-04-21 ENCOUNTER — TELEPHONE (OUTPATIENT)
Dept: INTERNAL MEDICINE CLINIC | Facility: CLINIC | Age: 80
End: 2025-04-21

## 2025-04-21 NOTE — TELEPHONE ENCOUNTER
I called and she has finished the weekly dose, will go back to the 5000 units she was taking daily prior to doing the prescription one.     She admits that sometimes she forgets to take it but discussed getting pill box for by the toothbrush or by the nightstand to help her remember.    Per result notes from December.     Dear Mary,     I just left you a voicemail regarding your test results. Here are the recommendations from [facility].     Blood work reviewed. A1c is stable in prediabetes range. Triglycerides worsened- try to cut down on simple carbs and sugars.     Vitamin D is low. [facility] ordered a prescription strength vitamin D which you can take once a week for 12 weeks to replenish your stores. After the 12 weeks you can go back to your over the counter supplement.     The rest of your results are in acceptable levels.     Kind Regards,     JENNIFER Barclay   Written by Ning Castañeda RN on 12/14/2024 10:50 AM CST  Seen by patient Mary Leyva on 12/14/2024 12:32 PM

## 2025-04-21 NOTE — TELEPHONE ENCOUNTER
Patient calling to ask if should keep taking Vitamin D, and if so, if refill can be sent to [facility] in [place]. Stated took last pill.

## (undated) DIAGNOSIS — E78.5 HYPERLIPIDEMIA, UNSPECIFIED HYPERLIPIDEMIA TYPE: Primary | ICD-10-CM

## (undated) DIAGNOSIS — R79.89 ELEVATED TSH: ICD-10-CM

## (undated) NOTE — LETTER
07/25/20        Cande Sommers  [address] [facility]      Dear Chun Fowler records indicate that you have outstanding lab work and or testing that was ordered for you and has not yet been completed:  Orders Placed This Encounter      MIKE GARCIA

## (undated) NOTE — MR AVS SNAPSHOT
[address] [facility] [address] [id number]  [phone]  [phone]               Thank you for choosing us for your health care visit with Sami Lagunas PT.   We are glad to serve you and happy to provide you with t

## (undated) NOTE — MR AVS SNAPSHOT
[address] [facility] [address]  [phone]  [phone]               Thank you for choosing us for your health care visit with Yashira Thayer PT.   We are glad to serve you and happy to provide you with t [employer] will allow you to access patient instructions from your recent visit,  view other health information, and more. To sign up or find more information, go to [email or web address] and click on the Sign Up Now link in the [employer] box.      Enter

## (undated) NOTE — LETTER
21      Patient: Dorcas Eden  : 1945 Visit date: 2021    Dear Black Ely,      I examined your patient in consultation today. She has an enlarging ganglion of the right index finger. We will plan on surgical excision.     Suni Santos

## (undated) NOTE — MR AVS SNAPSHOT
[address] for [address], [address] [place] [address]  [phone]               Thank you for choosing us for your health care visit with Antonetta Seip, DO.   We are glad to serve you and happy to provide "[employer] will allow you to access patient instructions from your recent visit,  view other health information, and more. To sign up or find more information, go to [email or web address] and click on the Sign Up Now link in the [employer] box.      Enter

## (undated) NOTE — LETTER
[facility]  Administration of Anesthesia  1. Ross Pizarro, or _________________________________ acting on her behalf, (Patient) (Dependent/Representative) request to receive anesthesia for my pending procedure/operation/treatment.   A phys 6. OBSTETRIC PATIENTS: Specific risks/consequences of spinal/epidural anesthesia may include itching, low blood pressure, difficulty urinating, slowing of the baby's heart rate and headache.  Rare risks include infections, high spinal block, spinal bleeding ___________________________________________________           _____________________________________________________  Date/Time                                                                                               Responsible person in case of minor

## (undated) NOTE — MR AVS SNAPSHOT
[address] [facility] [address] [id number]  [phone]  [phone]               Thank you for choosing us for your health care visit with Netta Tee PT.   We are glad to serve you and happy to provide you with t [employer] will allow you to access patient instructions from your recent visit,  view other health information, and more. To sign up or find more information, go to [email or web address] and click on the Sign Up Now link in the [employer] box.      Enter

## (undated) NOTE — MR AVS SNAPSHOT
[address] [facility] [id number]  [phone]               Thank you for choosing us for your health care visit with Denise Suggs PT.   We are glad to serve you and happy to provide you with t

## (undated) NOTE — MR AVS SNAPSHOT
[address] [facility] [address]  [phone]  [phone]               Thank you for choosing us for your health care visit with Leeann Reddy PT.   We are glad to serve you and happy to provide you with t Enter your [employer] Activation Code exactly as it appears below along with your Zip Code and Date of Birth to complete the sign-up process. If you do not sign up before the expiration date, you must request a new code.     Your unique [employer] Access Code: [email or web address]

## (undated) NOTE — MR AVS SNAPSHOT
[address] [facility] [id number]  [phone]  [phone]               Thank you for choosing us for your health care visit with Jessie Doyle PT.   We are glad to serve you and happy to provide you with t [employer] will allow you to access patient instructions from your recent visit,  view other health information, and more. To sign up or find more information, go to [email or web address] and click on the Sign Up Now link in the [employer] box.      Enter

## (undated) NOTE — MR AVS SNAPSHOT
[address] [facility] [address]  [phone]  [phone]               Thank you for choosing us for your health care visit with Vance Kelly PT.   We are glad to serve you and happy to provide you with t Your unique [employer] Access Code: [email or web address]  Expires: 5/28/2017  7:01 PM    If you have questions, you can call [phone] to talk to our [facility] Staff. Remember, Ge[employer] is NOT to be used for urgent needs. For medical emergencies, dial 911.